# Patient Record
Sex: FEMALE | Race: WHITE | Employment: PART TIME | URBAN - METROPOLITAN AREA
[De-identification: names, ages, dates, MRNs, and addresses within clinical notes are randomized per-mention and may not be internally consistent; named-entity substitution may affect disease eponyms.]

---

## 2017-07-07 ENCOUNTER — APPOINTMENT (OUTPATIENT)
Dept: GENERAL RADIOLOGY | Age: 37
DRG: 871 | End: 2017-07-07
Attending: FAMILY MEDICINE
Payer: SELF-PAY

## 2017-07-07 ENCOUNTER — HOSPITAL ENCOUNTER (INPATIENT)
Age: 37
LOS: 3 days | Discharge: HOME | DRG: 871 | End: 2017-07-10
Attending: FAMILY MEDICINE | Admitting: FAMILY MEDICINE
Payer: SELF-PAY

## 2017-07-07 PROBLEM — I95.9 SEPSIS WITH HYPOTENSION (HCC): Status: ACTIVE | Noted: 2017-07-07

## 2017-07-07 PROBLEM — E83.42 HYPOMAGNESEMIA: Status: ACTIVE | Noted: 2017-07-07

## 2017-07-07 PROBLEM — A41.9 SEPSIS DUE TO URINARY TRACT INFECTION (HCC): Status: ACTIVE | Noted: 2017-07-07

## 2017-07-07 PROBLEM — N39.0 SEPSIS DUE TO URINARY TRACT INFECTION (HCC): Status: ACTIVE | Noted: 2017-07-07

## 2017-07-07 PROBLEM — E87.6 HYPOKALEMIA: Status: ACTIVE | Noted: 2017-07-07

## 2017-07-07 PROBLEM — A41.9 SEPSIS (HCC): Status: ACTIVE | Noted: 2017-07-07

## 2017-07-07 PROBLEM — N10 PYELONEPHRITIS, ACUTE: Status: ACTIVE | Noted: 2017-07-07

## 2017-07-07 PROBLEM — A41.9 SEPSIS WITH HYPOTENSION (HCC): Status: ACTIVE | Noted: 2017-07-07

## 2017-07-07 LAB
ANION GAP BLD CALC-SCNC: 6 MMOL/L (ref 5–15)
BUN SERPL-MCNC: 4 MG/DL (ref 6–20)
BUN/CREAT SERPL: 8 (ref 12–20)
CA-I BLD-SCNC: 0.96 MMOL/L (ref 1.13–1.32)
CALCIUM SERPL-MCNC: 7.1 MG/DL (ref 8.5–10.1)
CHLORIDE SERPL-SCNC: 113 MMOL/L (ref 97–108)
CO2 SERPL-SCNC: 21 MMOL/L (ref 21–32)
CREAT SERPL-MCNC: 0.52 MG/DL (ref 0.55–1.02)
GLUCOSE SERPL-MCNC: 104 MG/DL (ref 65–100)
LACTATE SERPL-SCNC: 2 MMOL/L (ref 0.4–2)
MAGNESIUM SERPL-MCNC: 2.5 MG/DL (ref 1.6–2.4)
POTASSIUM SERPL-SCNC: 4 MMOL/L (ref 3.5–5.1)
SODIUM SERPL-SCNC: 140 MMOL/L (ref 136–145)
TROPONIN I SERPL-MCNC: <0.04 NG/ML

## 2017-07-07 PROCEDURE — 74011000250 HC RX REV CODE- 250: Performed by: FAMILY MEDICINE

## 2017-07-07 PROCEDURE — 82330 ASSAY OF CALCIUM: CPT | Performed by: FAMILY MEDICINE

## 2017-07-07 PROCEDURE — 74011250636 HC RX REV CODE- 250/636: Performed by: FAMILY MEDICINE

## 2017-07-07 PROCEDURE — 36415 COLL VENOUS BLD VENIPUNCTURE: CPT | Performed by: FAMILY MEDICINE

## 2017-07-07 PROCEDURE — 85379 FIBRIN DEGRADATION QUANT: CPT | Performed by: FAMILY MEDICINE

## 2017-07-07 PROCEDURE — 71010 XR CHEST PORT: CPT

## 2017-07-07 PROCEDURE — 83735 ASSAY OF MAGNESIUM: CPT | Performed by: FAMILY MEDICINE

## 2017-07-07 PROCEDURE — 65610000006 HC RM INTENSIVE CARE

## 2017-07-07 PROCEDURE — 84484 ASSAY OF TROPONIN QUANT: CPT | Performed by: FAMILY MEDICINE

## 2017-07-07 PROCEDURE — 83605 ASSAY OF LACTIC ACID: CPT | Performed by: FAMILY MEDICINE

## 2017-07-07 PROCEDURE — 93005 ELECTROCARDIOGRAM TRACING: CPT

## 2017-07-07 PROCEDURE — 80048 BASIC METABOLIC PNL TOTAL CA: CPT | Performed by: FAMILY MEDICINE

## 2017-07-07 PROCEDURE — 80061 LIPID PANEL: CPT | Performed by: FAMILY MEDICINE

## 2017-07-07 RX ORDER — SODIUM CHLORIDE 0.9 % (FLUSH) 0.9 %
SYRINGE (ML) INJECTION
Status: DISCONTINUED
Start: 2017-07-07 | End: 2017-07-07

## 2017-07-07 RX ORDER — SODIUM CHLORIDE 0.9 % (FLUSH) 0.9 %
5-10 SYRINGE (ML) INJECTION AS NEEDED
Status: DISCONTINUED | OUTPATIENT
Start: 2017-07-07 | End: 2017-07-08 | Stop reason: SDUPTHER

## 2017-07-07 RX ORDER — DIPHENHYDRAMINE HYDROCHLORIDE 50 MG/ML
25 INJECTION, SOLUTION INTRAMUSCULAR; INTRAVENOUS
Status: DISCONTINUED | OUTPATIENT
Start: 2017-07-07 | End: 2017-07-07

## 2017-07-07 RX ORDER — ACETAMINOPHEN 325 MG/1
650 TABLET ORAL
Status: DISCONTINUED | OUTPATIENT
Start: 2017-07-07 | End: 2017-07-10 | Stop reason: HOSPADM

## 2017-07-07 RX ORDER — HYDROMORPHONE HYDROCHLORIDE 2 MG/ML
1 INJECTION, SOLUTION INTRAMUSCULAR; INTRAVENOUS; SUBCUTANEOUS
Status: DISCONTINUED | OUTPATIENT
Start: 2017-07-07 | End: 2017-07-07

## 2017-07-07 RX ORDER — MUPIROCIN 20 MG/G
OINTMENT TOPICAL 2 TIMES DAILY
Status: DISCONTINUED | OUTPATIENT
Start: 2017-07-08 | End: 2017-07-10 | Stop reason: HOSPADM

## 2017-07-07 RX ORDER — SODIUM CHLORIDE 0.9 % (FLUSH) 0.9 %
5-10 SYRINGE (ML) INJECTION AS NEEDED
Status: DISCONTINUED | OUTPATIENT
Start: 2017-07-07 | End: 2017-07-07

## 2017-07-07 RX ORDER — ONDANSETRON 2 MG/ML
4 INJECTION INTRAMUSCULAR; INTRAVENOUS
Status: DISCONTINUED | OUTPATIENT
Start: 2017-07-07 | End: 2017-07-10 | Stop reason: HOSPADM

## 2017-07-07 RX ORDER — SODIUM CHLORIDE 9 MG/ML
200 INJECTION, SOLUTION INTRAVENOUS CONTINUOUS
Status: DISCONTINUED | OUTPATIENT
Start: 2017-07-07 | End: 2017-07-07

## 2017-07-07 RX ORDER — MUPIROCIN 20 MG/G
OINTMENT TOPICAL 2 TIMES DAILY
Status: DISCONTINUED | OUTPATIENT
Start: 2017-07-07 | End: 2017-07-07

## 2017-07-07 RX ORDER — LEVOFLOXACIN 5 MG/ML
750 INJECTION, SOLUTION INTRAVENOUS EVERY 24 HOURS
Status: DISCONTINUED | OUTPATIENT
Start: 2017-07-08 | End: 2017-07-08

## 2017-07-07 RX ORDER — ACETAMINOPHEN 325 MG/1
650 TABLET ORAL
Status: DISCONTINUED | OUTPATIENT
Start: 2017-07-07 | End: 2017-07-07 | Stop reason: ALTCHOICE

## 2017-07-07 RX ORDER — KETOROLAC TROMETHAMINE 15 MG/ML
15 INJECTION, SOLUTION INTRAMUSCULAR; INTRAVENOUS
Status: DISCONTINUED | OUTPATIENT
Start: 2017-07-07 | End: 2017-07-07

## 2017-07-07 RX ORDER — LEVOFLOXACIN 5 MG/ML
750 INJECTION, SOLUTION INTRAVENOUS EVERY 24 HOURS
Status: DISCONTINUED | OUTPATIENT
Start: 2017-07-08 | End: 2017-07-07

## 2017-07-07 RX ORDER — ENOXAPARIN SODIUM 100 MG/ML
40 INJECTION SUBCUTANEOUS EVERY 24 HOURS
Status: DISCONTINUED | OUTPATIENT
Start: 2017-07-08 | End: 2017-07-07

## 2017-07-07 RX ORDER — SODIUM CHLORIDE 0.9 % (FLUSH) 0.9 %
5-10 SYRINGE (ML) INJECTION EVERY 8 HOURS
Status: DISCONTINUED | OUTPATIENT
Start: 2017-07-07 | End: 2017-07-10 | Stop reason: HOSPADM

## 2017-07-07 RX ORDER — SODIUM CHLORIDE 9 MG/ML
150 INJECTION, SOLUTION INTRAVENOUS CONTINUOUS
Status: DISCONTINUED | OUTPATIENT
Start: 2017-07-07 | End: 2017-07-10 | Stop reason: HOSPADM

## 2017-07-07 RX ORDER — SODIUM CHLORIDE 0.9 % (FLUSH) 0.9 %
5-10 SYRINGE (ML) INJECTION EVERY 8 HOURS
Status: DISCONTINUED | OUTPATIENT
Start: 2017-07-07 | End: 2017-07-08 | Stop reason: SDUPTHER

## 2017-07-07 RX ORDER — SODIUM CHLORIDE 0.9 % (FLUSH) 0.9 %
5-10 SYRINGE (ML) INJECTION AS NEEDED
Status: DISCONTINUED | OUTPATIENT
Start: 2017-07-07 | End: 2017-07-10 | Stop reason: HOSPADM

## 2017-07-07 RX ORDER — CALCIUM GLUCONATE 94 MG/ML
1 INJECTION, SOLUTION INTRAVENOUS
Status: DISCONTINUED | OUTPATIENT
Start: 2017-07-08 | End: 2017-07-08

## 2017-07-07 RX ADMIN — SODIUM CHLORIDE 100 ML/HR: 900 INJECTION, SOLUTION INTRAVENOUS at 22:23

## 2017-07-07 RX ADMIN — Medication 10 ML: at 22:24

## 2017-07-07 RX ADMIN — PROCHLORPERAZINE EDISYLATE 2.5 MG: 5 INJECTION INTRAMUSCULAR; INTRAVENOUS at 22:30

## 2017-07-07 RX ADMIN — Medication 10 ML: at 22:23

## 2017-07-07 NOTE — IP AVS SNAPSHOT
[address] [facility] 
[phone] Patient: Isac Calixto MRN: [id number] [facility]:31/76/0549 You are allergic to the following Allergen Reactions Penicillins Anaphylaxis Pt has previously had Ceftin w/o issue Recent Documentation Height Weight BMI OB Status Smoking Status 1.626 m 65.9 kg 24.94 kg/m2 Having regular periods Never Smoker Unresulted Labs Order Current Status EHRLICHIOSIS (HME AND HGE) AB PANEL In process R RICKETTSII AB IGG W/REFL In process R RICKETTSII IGM In process Emergency Contacts  (Rel.) Home Phone Work Phone Mobile Phone Cherelle Osuna (Mother) -- -- [phone] About your hospitalization You were admitted on:  July 7, 2017 You last received care in the:  [facility] 1 MEDICAL ONCOLOGY You were discharged on:  July 10, 2017 Why you were hospitalized Your primary diagnosis was:  Not on File Your diagnoses also included:  Sepsis (Hcc) Providers Seen During Your Hospitalizations Provider Role Specialty Primary office phone Paula Cortez MD Attending Provider Hospitalist [phone] Olam Cushing, MD Attending Provider Hospitalist [phone] Your Primary Care Physician (PCP) Primary Care Physician Office Phone Office Fax NONE ** None ** ** None ** Follow-up Information Follow up With Details Comments Contact Info None   None (395) Patient stated that they have no PCP Current Discharge Medication List  
  
START taking these medications Dose & Instructions Dispensing Information Comments Morning Noon Evening Bedtime  
 ciprofloxacin HCl 500 mg tablet Commonly known as:  CIPRO Your last dose was: Your next dose is:    
   
   
 Dose:  500 mg Take 1 Tab by mouth two (2) times a day for 10 days. Quantity:  20 Tab Refills:  0  
     
   
   
   
  
 doxycycline 100 mg tablet Commonly known as:  ADOXA Your last dose was: Your next dose is:    
   
   
 Dose:  100 mg Take 1 Tab by mouth two (2) times a day for 5 days. Quantity:  10 Tab Refills:  0  
     
   
   
   
  
 ibuprofen 400 mg tablet Commonly known as:  MOTRIN Your last dose was: Your next dose is:    
   
   
 Dose:  400 mg Take 1 Tab by mouth every six (6) hours as needed. Quantity:  25 Tab Refills:  0 CONTINUE these medications which have NOT CHANGED Dose & Instructions Dispensing Information Comments Morning Noon Evening Bedtime  
 ondansetron 4 mg disintegrating tablet Commonly known as:  ZOFRAN ODT Your last dose was: Your next dose is:    
   
   
 Dose:  4 mg Take 1 Tab by mouth every eight (8) hours as needed for Nausea. Quantity:  10 Tab Refills:  0 STOP taking these medications   
 cefUROXime 500 mg tablet Commonly known as:  CEFTIN  
   
  
 HYDROcodone-acetaminophen 5-325 mg per tablet Commonly known as:  Alison Donaldson Where to Get Your Medications Information on where to get these meds will be given to you by the nurse or doctor. ! Ask your nurse or doctor about these medications  
  ciprofloxacin HCl 500 mg tablet  
 doxycycline 100 mg tablet  
 ibuprofen 400 mg tablet  
 ondansetron 4 mg disintegrating tablet Discharge Instructions Patient Discharge Instructions Elyse Maxwell / [id number] : 1980 Admitted 2017 Discharged: 7/10/2017 DISCHARGE DIAGNOSIS:  
 
Sepsis ([address] (2017) E coli pyelonephritis Headache ? Tickborne infection What to do at [facility] 1. Recommended diet: Regular Diet 2. Recommended activity: Activity as tolerated 3.  If you experience any of the following symptoms then please call your primary care physician or return to the emergency room if you cannot get hold of your doctor: 
 Fevers > 100.5, chills Nausea or vomiting, persistent diarrhea > 24 hours Blood in stool or black stools Chest pain or SOB Follow-up Information Follow up With Details Comments Contact Info None   None (395) Patient stated that they have no PCP Should expect to feel better every day, decreasing symptoms and no fevers Call Dr Sonya Lewis or go to the ED if you develop new fevers or worsening headache Cell phone [phone] am to 9 pm daily Information obtained by : 
I understand that if any problems occur once I am at home I am to contact my physician. I understand and acknowledge receipt of the instructions indicated above. Physician's or R.N.'s Signature                                                                  Date/Time Patient or Representative Signature                                                          Date/Time Discharge Orders None [employer] Announcement We are excited to announce that we are making your provider's discharge notes available to you in [employer]. You will see these notes when they are completed and signed by the physician that discharged you from your recent hospital stay. If you have any questions or concerns about any information you see in [employer], please call the Health Information Department where you were seen or reach out to your Primary Care Provider for more information about your plan of care. Introducing [facility]!    
 [facility] introduces [employer] patient portal. Now you can access parts of your medical record, email your doctor's office, and request medication refills online. 1. In your internet browser, go to [email or web address] 2. Click on the First Time User? Click Here link in the Sign In box. You will see the New Member Sign Up page. 3. Enter your [employer] Access Code exactly as it appears below. You will not need to use this code after youve completed the sign-up process. If you do not sign up before the expiration date, you must request a new code. · [employer] Access Code: [email or web address] Expires: 10/4/2017 11:24 PM 
 
4. Enter the last four digits of your Social Security Number (xxxx) and Date of Birth (mm/dd/yyyy) as indicated and click Submit. You will be taken to the next sign-up page. 5. Create a [employer] ID. This will be your [employer] login ID and cannot be changed, so think of one that is secure and easy to remember. 6. Create a [employer] password. You can change your password at any time. 7. Enter your Password Reset Question and Answer. This can be used at a later time if you forget your password. 8. Enter your e-mail address. You will receive e-mail notification when new information is available in [address] 9. Click Sign Up. You can now view and download portions of your medical record. 10. Click the Download Summary menu link to download a portable copy of your medical information. If you have questions, please visit the Frequently Asked Questions section of the [employer] website. Remember, [employer] is NOT to be used for urgent needs. For medical emergencies, dial 911. Now available from your iPhone and Android! General Information Please provide this summary of care documentation to your next provider. Patient Signature:  ____________________________________________________________ Date:  ____________________________________________________________  
  
Emmanuel Brake  Provider Signature: ____________________________________________________________ Date:  ____________________________________________________________

## 2017-07-08 ENCOUNTER — APPOINTMENT (OUTPATIENT)
Dept: CT IMAGING | Age: 37
DRG: 871 | End: 2017-07-08
Attending: INTERNAL MEDICINE
Payer: SELF-PAY

## 2017-07-08 LAB
ALBUMIN SERPL BCP-MCNC: 2.1 G/DL (ref 3.5–5)
ALBUMIN/GLOB SERPL: 0.7 {RATIO} (ref 1.1–2.2)
ALP SERPL-CCNC: 64 U/L (ref 45–117)
ALT SERPL-CCNC: 13 U/L (ref 12–78)
ANION GAP BLD CALC-SCNC: 7 MMOL/L (ref 5–15)
AST SERPL W P-5'-P-CCNC: 9 U/L (ref 15–37)
ATRIAL RATE: 107 BPM
BASOPHILS # BLD AUTO: 0 K/UL (ref 0–0.1)
BASOPHILS # BLD: 0 % (ref 0–1)
BILIRUB SERPL-MCNC: 0.4 MG/DL (ref 0.2–1)
BUN SERPL-MCNC: 3 MG/DL (ref 6–20)
BUN/CREAT SERPL: 7 (ref 12–20)
CALCIUM SERPL-MCNC: 7.3 MG/DL (ref 8.5–10.1)
CALCULATED P AXIS, ECG09: 51 DEGREES
CALCULATED R AXIS, ECG10: 67 DEGREES
CALCULATED T AXIS, ECG11: 55 DEGREES
CHLORIDE SERPL-SCNC: 109 MMOL/L (ref 97–108)
CHOLEST SERPL-MCNC: 114 MG/DL
CO2 SERPL-SCNC: 22 MMOL/L (ref 21–32)
CREAT SERPL-MCNC: 0.43 MG/DL (ref 0.55–1.02)
D DIMER PPP FEU-MCNC: 1.23 MG/L FEU (ref 0–0.65)
DIAGNOSIS, 93000: NORMAL
EOSINOPHIL # BLD: 0 K/UL (ref 0–0.4)
EOSINOPHIL NFR BLD: 0 % (ref 0–7)
ERYTHROCYTE [DISTWIDTH] IN BLOOD BY AUTOMATED COUNT: 12.9 % (ref 11.5–14.5)
GLOBULIN SER CALC-MCNC: 2.9 G/DL (ref 2–4)
GLUCOSE SERPL-MCNC: 93 MG/DL (ref 65–100)
HCG UR QL: NEGATIVE
HCT VFR BLD AUTO: 28.4 % (ref 35–47)
HDLC SERPL-MCNC: 29 MG/DL
HDLC SERPL: 3.9 {RATIO} (ref 0–5)
HGB BLD-MCNC: 9.6 G/DL (ref 11.5–16)
LACTATE SERPL-SCNC: 0.5 MMOL/L (ref 0.4–2)
LDLC SERPL CALC-MCNC: 69.4 MG/DL (ref 0–100)
LIPID PROFILE,FLP: NORMAL
LYMPHOCYTES # BLD AUTO: 13 % (ref 12–49)
LYMPHOCYTES # BLD: 1 K/UL (ref 0.8–3.5)
MCH RBC QN AUTO: 31.2 PG (ref 26–34)
MCHC RBC AUTO-ENTMCNC: 33.8 G/DL (ref 30–36.5)
MCV RBC AUTO: 92.2 FL (ref 80–99)
MONOCYTES # BLD: 1 K/UL (ref 0–1)
MONOCYTES NFR BLD AUTO: 13 % (ref 5–13)
NEUTS SEG # BLD: 6.1 K/UL (ref 1.8–8)
NEUTS SEG NFR BLD AUTO: 74 % (ref 32–75)
P-R INTERVAL, ECG05: 130 MS
PLATELET # BLD AUTO: 151 K/UL (ref 150–400)
POTASSIUM SERPL-SCNC: 3.7 MMOL/L (ref 3.5–5.1)
PROT SERPL-MCNC: 5 G/DL (ref 6.4–8.2)
Q-T INTERVAL, ECG07: 326 MS
QRS DURATION, ECG06: 74 MS
QTC CALCULATION (BEZET), ECG08: 435 MS
RBC # BLD AUTO: 3.08 M/UL (ref 3.8–5.2)
SODIUM SERPL-SCNC: 138 MMOL/L (ref 136–145)
TRIGL SERPL-MCNC: 78 MG/DL (ref ?–150)
TROPONIN I SERPL-MCNC: <0.04 NG/ML
VENTRICULAR RATE, ECG03: 107 BPM
VLDLC SERPL CALC-MCNC: 15.6 MG/DL
WBC # BLD AUTO: 8.2 K/UL (ref 3.6–11)

## 2017-07-08 PROCEDURE — 83605 ASSAY OF LACTIC ACID: CPT | Performed by: FAMILY MEDICINE

## 2017-07-08 PROCEDURE — 74011250636 HC RX REV CODE- 250/636: Performed by: FAMILY MEDICINE

## 2017-07-08 PROCEDURE — 74011000258 HC RX REV CODE- 258: Performed by: INTERNAL MEDICINE

## 2017-07-08 PROCEDURE — 74011250636 HC RX REV CODE- 250/636: Performed by: EMERGENCY MEDICINE

## 2017-07-08 PROCEDURE — 36415 COLL VENOUS BLD VENIPUNCTURE: CPT | Performed by: FAMILY MEDICINE

## 2017-07-08 PROCEDURE — 84484 ASSAY OF TROPONIN QUANT: CPT

## 2017-07-08 PROCEDURE — 80053 COMPREHEN METABOLIC PANEL: CPT

## 2017-07-08 PROCEDURE — 81025 URINE PREGNANCY TEST: CPT | Performed by: INTERNAL MEDICINE

## 2017-07-08 PROCEDURE — 74011250636 HC RX REV CODE- 250/636: Performed by: INTERNAL MEDICINE

## 2017-07-08 PROCEDURE — 74011250637 HC RX REV CODE- 250/637: Performed by: FAMILY MEDICINE

## 2017-07-08 PROCEDURE — 71275 CT ANGIOGRAPHY CHEST: CPT

## 2017-07-08 PROCEDURE — 74011636320 HC RX REV CODE- 636/320: Performed by: INTERNAL MEDICINE

## 2017-07-08 PROCEDURE — 74011000250 HC RX REV CODE- 250: Performed by: INTERNAL MEDICINE

## 2017-07-08 PROCEDURE — 65610000006 HC RM INTENSIVE CARE

## 2017-07-08 PROCEDURE — 74011000258 HC RX REV CODE- 258: Performed by: FAMILY MEDICINE

## 2017-07-08 PROCEDURE — 85025 COMPLETE CBC W/AUTO DIFF WBC: CPT | Performed by: FAMILY MEDICINE

## 2017-07-08 RX ORDER — AZTREONAM 1 G/1
1 INJECTION, POWDER, LYOPHILIZED, FOR SOLUTION INTRAMUSCULAR; INTRAVENOUS EVERY 8 HOURS
Status: DISCONTINUED | OUTPATIENT
Start: 2017-07-08 | End: 2017-07-08

## 2017-07-08 RX ORDER — SODIUM CHLORIDE 0.9 % (FLUSH) 0.9 %
10 SYRINGE (ML) INJECTION
Status: COMPLETED | OUTPATIENT
Start: 2017-07-08 | End: 2017-07-08

## 2017-07-08 RX ORDER — HYDROMORPHONE HYDROCHLORIDE 1 MG/ML
0.5 INJECTION, SOLUTION INTRAMUSCULAR; INTRAVENOUS; SUBCUTANEOUS
Status: DISCONTINUED | OUTPATIENT
Start: 2017-07-08 | End: 2017-07-08

## 2017-07-08 RX ORDER — SODIUM CHLORIDE 9 MG/ML
50 INJECTION, SOLUTION INTRAVENOUS
Status: COMPLETED | OUTPATIENT
Start: 2017-07-08 | End: 2017-07-09

## 2017-07-08 RX ORDER — HYDROMORPHONE HYDROCHLORIDE 1 MG/ML
0.2 INJECTION, SOLUTION INTRAMUSCULAR; INTRAVENOUS; SUBCUTANEOUS
Status: DISCONTINUED | OUTPATIENT
Start: 2017-07-08 | End: 2017-07-09

## 2017-07-08 RX ADMIN — DOXYCYCLINE 100 MG: 100 INJECTION, POWDER, LYOPHILIZED, FOR SOLUTION INTRAVENOUS at 21:06

## 2017-07-08 RX ADMIN — GENTAMICIN SULFATE 300 MG: 40 INJECTION, SOLUTION INTRAMUSCULAR; INTRAVENOUS at 00:02

## 2017-07-08 RX ADMIN — SODIUM CHLORIDE 100 ML/HR: 900 INJECTION, SOLUTION INTRAVENOUS at 09:03

## 2017-07-08 RX ADMIN — Medication 10 ML: at 21:10

## 2017-07-08 RX ADMIN — ACETAMINOPHEN 650 MG: 325 TABLET, FILM COATED ORAL at 22:52

## 2017-07-08 RX ADMIN — SODIUM CHLORIDE 100 ML/HR: 900 INJECTION, SOLUTION INTRAVENOUS at 19:10

## 2017-07-08 RX ADMIN — HYDROMORPHONE HYDROCHLORIDE 0.5 MG: 1 INJECTION, SOLUTION INTRAMUSCULAR; INTRAVENOUS; SUBCUTANEOUS at 02:02

## 2017-07-08 RX ADMIN — IOPAMIDOL 100 ML: 755 INJECTION, SOLUTION INTRAVENOUS at 02:00

## 2017-07-08 RX ADMIN — AZTREONAM 1 G: 1 INJECTION, POWDER, LYOPHILIZED, FOR SOLUTION INTRAMUSCULAR; INTRAVENOUS at 23:39

## 2017-07-08 RX ADMIN — Medication 10 ML: at 05:10

## 2017-07-08 RX ADMIN — Medication 10 ML: at 13:29

## 2017-07-08 RX ADMIN — LEVOFLOXACIN 750 MG: 5 INJECTION, SOLUTION INTRAVENOUS at 05:10

## 2017-07-08 RX ADMIN — CALCIUM GLUCONATE 1 G: 94 INJECTION, SOLUTION INTRAVENOUS at 01:53

## 2017-07-08 RX ADMIN — HYDROMORPHONE HYDROCHLORIDE 0.2 MG: 1 INJECTION, SOLUTION INTRAMUSCULAR; INTRAVENOUS; SUBCUTANEOUS at 19:46

## 2017-07-08 RX ADMIN — Medication 10 ML: at 22:00

## 2017-07-08 RX ADMIN — MUPIROCIN: 20 OINTMENT TOPICAL at 08:33

## 2017-07-08 RX ADMIN — MUPIROCIN: 20 OINTMENT TOPICAL at 18:12

## 2017-07-08 RX ADMIN — ONDANSETRON 4 MG: 2 INJECTION INTRAMUSCULAR; INTRAVENOUS at 16:53

## 2017-07-08 RX ADMIN — ACETAMINOPHEN 650 MG: 325 TABLET, FILM COATED ORAL at 12:14

## 2017-07-08 RX ADMIN — ACETAMINOPHEN 650 MG: 325 TABLET, FILM COATED ORAL at 16:53

## 2017-07-08 RX ADMIN — PHENYLEPHRINE HYDROCHLORIDE 10 MCG/MIN: 10 INJECTION INTRAVENOUS at 01:53

## 2017-07-08 RX ADMIN — ACETAMINOPHEN 650 MG: 325 TABLET, FILM COATED ORAL at 08:25

## 2017-07-08 RX ADMIN — Medication 10 ML: at 19:47

## 2017-07-08 RX ADMIN — Medication 10 ML: at 01:28

## 2017-07-08 RX ADMIN — SODIUM CHLORIDE 50 ML/HR: 900 INJECTION, SOLUTION INTRAVENOUS at 02:00

## 2017-07-08 RX ADMIN — ACETAMINOPHEN 650 MG: 325 TABLET, FILM COATED ORAL at 00:07

## 2017-07-08 NOTE — PROGRESS NOTES
[facility] Consult Service Progress NOTE  Pulmonary, Critical Care, and Sleep Medicine    Name: Christian Murillo MRN: [id number]   : 1980 Hospital: [address] [facility]   Date: 2017  Admission Date: 2017     Chart and notes reviewed. Data reviewed. Pt seen on rounds earlier today. I have evaluated and examined the patient. Pt is acutely ill. Reviewed the evaluation and will assist in implementing the plan as outlined by Dr. Mary Ellen Ochoa and team    Hospital Day: 2    Patient Active Problem List   Diagnosis Code    Pyelonephritis, acute N10    Sepsis with hypotension ([address] A41.9    Hypokalemia E87.6    Hypomagnesemia E83.42    Sepsis ([address] A41.9       I was asked by Kai Fregoso MD to see Christian Murillo in consultation for a chief complaint of sepsis with shock. Briefly, pt is a 40 yo F w/o significant PMHx admitted to [facility] with sepsis and hypotension UTI/acute pyelonephritis. Non-contrast CT abd/pelvis and retroperitoneal US were WNL w/o evidence of stones, perinephric abscess, hydronephrosis or hydroureter. Pt received multiple IVF boluses in ED with initial improvement in BP and eventually admitted to the floor and continued on maintenance IVF (NS at 200 mL/hr) and empiric IV Abx coverage with Levaquin. She spiked temp of 102 around 1500 and gentamicin added to her regimen. Pt continued to c/o left flank pain that is not relieved by IV Toradol but BP dropped when started on morphine and later Dilaudid. She received total of 2 L NS boluses with BP improving to 100/56. BP, however, dropped again to 83/52 and at this point family requested transfer to a higher level of care/ICU setting for possibility of pt's needing pressors support. Pt accepted by Dr. Mary Ellen Ochoa, hospitalist on call at [address] Pt is in CCU on IV sierra. She received over 6 liters of IV fluids since admission. Is on her menstrual period. Flank, abdominal pain a little better.  He last UTI was six months ago. She does not always take abx and will self treat with cranberry juice and lots of wather. Long history of chronic UTIs. IMPRESSION:   1. Sepsis with shock, most likely from urinary source  2. Probable pyelonephritis with negative non contrast CT at [facility]  3. H/o recurrent UTI  4. Anemia from heavy periods? And now dilutional  5. Severe hypoalbuminemia  6. Pt is critically ill and at high risk of end organ dysfunction and failure  7. Critical care time with pt exclusive of procedures   30      minutes      RECOMMENDATIONS/PLAN:   1. IV sierra  2. D/c gentamicin  3. Continue IV levaquin  4. Await cultures  5. CCU monitoring  6. DVT, SUP prophylaxis   7. Will be available to assist in medical management while in the CCU pending disposition     ICU disposition :Unchanged. Risk of deterioration: high   [x] High complexity decision making was performed   [x] See my orders for details  Tubes:       Code: Full Code        Hemodynamics:    CO:    CI:    CVP:    SVR:   PAP Systolic:    PAP Diastolic:    PVR:    ZT57:        Ventilator Settings:      Mode Rate TV Press PEEP FiO2 PIP Min.  Vent                            Vital Signs: Intake/Output: Intake/Output:   Visit Vitals    BP 90/61    Pulse 80    Temp (!) 101.3 °F (38.5 °C)  Comment: Tylenol given    Resp 20    Ht 5' 4\" (1.626 m)    Wt 64 kg (141 lb 1.5 oz)    LMP 2017    SpO2 98%    BMI 24.22 kg/m2         O2 Device: Room air     Wt Readings from Last 4 Encounters:   17 64 kg (141 lb 1.5 oz)   17 59.9 kg (132 lb)       Temp (24hrs), Av °F (37.8 °C), Min:97.2 °F (36.2 °C), Max:103 °F (39.4 °C)     Intake/Output Summary (Last 24 hours) at 17 0957  Last data filed at 17 0900   Gross per 24 hour   Intake          1727.17 ml   Output             1585 ml   Net           142.17 ml     Last shift:      701 - 1900  In: 330 [I.V.:330]  Out: 385 [Urine:385]  Last 3 shifts: 1901 -  07  In: 1397.2 [P.O.:240; I.V.:1157.2]  Out: 1200 [Urine:1200]     Telemetry:    normal sinus rhythm    Physical Exam:    General: WDWN WF in no respiratory distress and acyanotic, alert and oriented times 3   HEENT: NCAT; no thrush   Neck: No abnormally enlarged lymph nodes. Chest: normal   Lungs: normal air entry   Heart: Regular rate and rhythm   Abdomen: soft, non-tender, without masses or organomegaly   :    Extremity: negative, clubbing;    Neuro: alert   Psych: oriented to time, place and person    Skin: Diaphoretic;   Pulses: Bilateral, Radial, 1+ Normal upper and lower extremity pulses   Capillary refill: normal warm;    DATA:    Interval lab and diagnostic data was reviewed. Interval radiology images were independently viewed and available reports were reviewed. All lab results for the last 24 hours reviewed.       MAR reviewed and pertinent medications noted or modified as needed    MEDS:   Current Facility-Administered Medications   Medication    Please draw Random Gentamicin level    PHENYLephrine (NEOSYNEPHRINE) 30,000 mcg in 0.9% sodium chloride 250 mL infusion    HYDROmorphone (PF) (DILAUDID) injection 0.2 mg    sodium chloride (NS) flush 5-10 mL    0.9% sodium chloride infusion    acetaminophen (TYLENOL) tablet 650 mg    levoFLOXacin (LEVAQUIN) 750 mg in D5W IVPB    sodium chloride (NS) flush 5-10 mL    ondansetron (ZOFRAN) injection 4 mg    prochlorperazine (COMPAZINE) with saline injection 2.5 mg    mupirocin (BACTROBAN) 2 % ointment        Labs:    Recent Labs      07/08/17 0328  07/07/17   0600 07/06/17 2015   WBC  8.2  8.0  9.8   HGB  9.6*  9.8*  12.2   PLT  151  147*  166     Recent Labs      07/08/17   0328  07/07/17   2222  07/07/17   0739  07/07/17   0600   07/06/17 2015   NA  138  140   --   139   < >  137   K  3.7  4.0   --   3.1*   < >  3.6   CL  109*  113*   --   104   < >  101   CO2  22  21   --   22   < >  27   GLU  93  104*   --   148*   < >  101*   BUN  3*  4* --   10   < >  12   CREA  0.43*  0.52*   --   0.81   < >  0.83   CA  7.3*  7.1*   --   7.0*   < >  8.4*   MG   --   2.5*  1.5*   --    --    --    LAC  0.5  2.0   --   0.8   < >  0.5   ALB  2.1*   --    --    --    --   3.4*   SGOT  9*   --    --    --    --   12*   ALT  13   --    --    --    --   14   LPSE   --    --    --    --    --   68*    < > = values in this interval not displayed. No results for input(s): PH, PCO2, PO2, HCO3, FIO2 in the last 72 hours.   Recent Labs      07/08/17   0328  07/07/17   2222   TROIQ  <0.04  <0.04     No results found for: BNPP, BNP   Lab Results   Component Value Date/Time    Culture result: NO GROWTH AFTER 15 HOURS 07/07/2017 01:45 PM    Culture result: NO GROWTH AFTER 15 HOURS 07/07/2017 01:45 PM    Culture result: NO GROWTH AFTER 22 HOURS 07/07/2017 04:15 AM     No results found for: VANCT, CPK  Lab Results   Component Value Date/Time    Color DARK YELLOW 07/07/2017 04:15 AM    Appearance HAZY 07/07/2017 04:15 AM    Specific gravity 1.020 07/07/2017 04:15 AM    pH (UA) 5.5 07/07/2017 04:15 AM    Protein 30 07/07/2017 04:15 AM    Glucose 100 07/07/2017 04:15 AM    Ketone 15 07/07/2017 04:15 AM    Blood MODERATE 07/07/2017 04:15 AM    Urobilinogen >8.0 07/07/2017 04:15 AM    Nitrites NEGATIVE  07/07/2017 04:15 AM    Leukocyte Esterase TRACE 07/07/2017 04:15 AM    WBC 20-50 07/07/2017 04:15 AM    RBC 0-5 07/07/2017 04:15 AM    Bacteria 1+ 07/07/2017 04:15 AM       No results found for: IRON, FE, TIBC, IBCT, PSAT, FERR  No results found for: SR, CRP, CARLOS, ANAIGG, RA, RPR, RPRT, VDRLT, VDRLS, TSH, TSHEXT   No results found for: TOXA1, RPR, HBCM, HBSAG, HAAB, HCAB1, HAAT, G6PD, CRYAC, HIVGT, HIVR, HIV1, HIV12, HIVPC, HIVRPI    Imaging:  []                           I have personally reviewed the patients radiographs and reports:        Results from Hospital Encounter encounter on 07/07/17   XR CHEST PORT   Narrative PORTABLE CHEST RADIOGRAPH/S: 7/7/2017 10:23 PM    INDICATION: Cough.    COMPARISON: 7/6/2017. TECHNIQUE: Portable frontal semiupright radiograph/s of the chest.    FINDINGS:   The lungs are clear. The central airways are patent. No pneumothorax or pleural  effusion. Impression IMPRESSION:   Clear lungs. Results from [place] encounter on 07/07/17   CTA CHEST W OR W WO CONT   Narrative CT ANGIOGRAPHY CHEST. 7/8/2017 1:32 AM     INDICATION: Abnormal d-dimer, tachypnea. COMPARISON: CT abdomen pelvis 7/6/2017. Same day chest radiograph. TECHNIQUE: CT angiography of the chest was performed after the administration of  100 cc IV contrast (Isovue 370). Coronal and sagittal, and coronal MIP  reconstructions were performed. CT dose reduction was achieved through use of a  standardized protocol tailored for this examination and automatic exposure  control for dose modulation. FINDINGS:  Interlobular septal thickening and bronchial wall thickening are consistent with  interstitial edema. There are trace bilateral pleural effusions. The heart is at  the upper limits of normal in size globally, and the left ventricle is enlarged. There is a trace pericardial effusion. There is no pulmonary embolism. The  central airways are patent. No pneumothorax. No thoracic lymphadenopathy. The  visualized upper abdomen is normal.         Impression IMPRESSION: Pulmonary edema and trace pleural and pericardial effusions. Left  ventriculomegaly. I have provided   30    minutes of critical care time rendering care exclusive of any procedures. During this entire length of time I was immediately available to the patient.      The reason for providing this level of medical care was due to a critical illness that impaired one or more vital organ systems, such that there was a high probability of imminent or life threatening deterioration in the patient's condition. Pt's condition is unstable and unpredictable.  This care involved high complexity decision making which includes reviewing the patient's past medical records, current laboratory results, medications that were immediately available to me and actual Xray films in order to assess, support vital system function, and to treat this degree of vital organ system failure, and to prevent further life threatening deterioration of the patients condition.     Anjelica Johnson MD

## 2017-07-08 NOTE — PROGRESS NOTES
Bedside and Verbal shift change report given to KADEEM Gage RN (oncoming nurse) by Rema Smith. Christin King RN (offgoing nurse). Report included the following information SBAR, Kardex, Intake/Output, MAR, Recent Results and Cardiac Rhythm NSR.   1930: Assessment completed. Patient reports that she feels much better. BP improved. Remains on Rojas @ 20mcg/min. Lungs clear, assessment benign other than non-productive cough and intermittent headach. 2106: Doxycycline infusion started L hand 20g PIV.  2120: Patient calls, c/o burning at IV site. Slowed rate of doxycycline to 50ml/hr with no improvement. IV site flushes well with brisk blood return. Moved abx line to R AC. Ice pack applied to R ac site. Patient reports tolerating well.   2200: Assisted patient up to bathroom. Patient reports RAC PIV site wet. Leaking clear fluid from site. No erythema, edema noted. PIV discontinued with catheter tip intact. 2250: After multiple attempts, 20g PIV started R outer AC by DURAN Díaz RN. Doxycycline restarted at 50ml/hr (approx 45 minutes left in current infusion.) Will start azactam infusion following doxy completion. 2330: Reassessment unchanged. 0018: Rojas-synephrine discontinued. 0025: Patient c/o headache, rates 6/10. Medicated with Dilaudid 0.2mg IV. 0200: Restarted Rojas @ 10mcg/min as BP not rebounded after Dilaudid given, though patient reports headache near resolved. 7160: Ambulated patient to bathroom to void. Reassessment unchanged. Intermittent headache, medicated alternating Dilaudid 0.2mg IV and Tylenol 650mg po. BP improved on Rojas @ 10mcg. 1+ edema vesta hands. Patient removed 4 rings, (3 yellow metal-2 of those with clear stones; and 1 silver metal ring.) Secured in sealable bag, placed in patient's makeup bag in closet with her consent and knowledge. 6762: Bedside and Verbal shift change report given to TARIK Warner RN (oncoming nurse) by KADEEM Gage RN (offgoing nurse).  Report included the following information SBAR, Kardex, Intake/Output, MAR, Recent Results and Cardiac Rhythm nsr.

## 2017-07-08 NOTE — H&P
Kennard Azeem Baig, [address]   HISTORY AND PHYSICAL       Name:  Judy Bustamante   MR#:  [id number]   :  1980   Account #:  [de-identified]        Date of Adm:  2017       CHIEF COMPLAINT: Flank pain and fever. HISTORY OF PRESENT ILLNESS: The patient is a 43-year-old female   with history of chronic UTIs, who presents to the hospital complaining   of the above mentioned symptoms. The patient reports that she   presented to [facility], presented with low   blood pressure and fevers. The patient reports that the symptoms   started about 2-3 days back. She felt that she had a urinary tract   infection. The patient reports that she has had multiple urinary tract   infections, usually that resolve on outpatient treatment, especially with   hydration. This time, she followed the same regimen, started drinking   increased amounts of fluid, but continued to have fever and chills. She   took some ibuprofen for the same, and started having some back pain   and bilateral flank pain. The patient also had one bout of vomiting, but   no melena, hemoptysis or hematemesis. She presents to the ER with a   temperature of 102.9, as well as was found to be hypotensive with   pallor and diaphoresis. The patient was admitted to the hospital and   was started on IV fluids, and levofloxacin. The patient continued to be   mildly hypotensive, was given multiple boluses of IV fluids and also   was started on gentamicin this morning. The patient continued to be   somewhat hypotensive, continued to have pain. Dose of Dilaudid was   tried, but the patient became hypotensive with the same, and thus, the   patient was requested to be transferred to [facility]. On arrival to the ICU, the patient was mildly hypotensive, and   complains of some pain in her bilateral flanks.  The patient reports that   she did have a headache, but denies any neck pain associated with   that. Denies any blurred vision, any neck stiffness, any runny nose,   sore throat, trouble swallowing, or trouble with speech. The patient   does report that she had a brief episode of some chest \"tightness\" and   some shortness of breath this morning but that resolved. The patient   currently is maintaining 100% O2 saturation on the monitor. The patient   also denies any lightheadedness, dizziness, any constipation, diarrhea,   focal or generalized neurologic weakness, any rash. Denies recent sick   contracts, consumption of exotic or unconventional food, or any other   symptoms or problems. The patient's mother is at bedside. PAST MEDICAL HISTORY: See above. HOME MEDICATIONS: The patient does not take anything on a   regular basis. REVIEW OF SYSTEMS: All point review of systems was done, which   was essentially negative except for the symptoms mentioned above. ALLERGIES: PENICILLIN. FAMILY HISTORY: Was discussed. Father had history of hypertension   and diabetes. PHYSICAL EXAMINATION   VITAL SIGNS: Temperature 99, pulse 84, respiratory rate 19, blood   pressure 82/43, pulse oximetry 100% on 2 Liters. GENERAL: Alert x3, awake, moderately distressed, pleasant female,   appears to be stated age. HEENT: Pupils equal and reactive to light. Dry mucous membranes. Tympanic membranes clear. NECK: Supple , no rigidity or fullness. CHEST: Clear to auscultation bilaterally. CORONARY: S1 and S2 were heard. ABDOMEN: Soft, tender to palpation in the flanks. No guarding or   rebound. Bowel sounds were hypoactive. EXTREMITIES: No cyanosis, clubbing or edema. NEUROPSYCHIATRIC: Pleasant mood and affect. Sensory grossly   within normal limits. DTRs 2+. Strength 5/5. SKIN: Warm. LABORATORY DATA: White count 8.0, hemoglobin 9.8, hematocrit   34, and platelets 127.  Sodium 139, potassium 3.1, chloride 22, anion   gap 13, BUN 10, creatinine 0.81, calcium 7, magnesium 1.5. Lactic   acid 0.5. Ultrasound of the abdomen showed unremarkable renal ultrasound. CT of the abdomen and pelvis showed normal CT of the abdomen and   pelvis. Urine done yesterday showed trace leukocyte esterase, 10-15 WBCs,   and 1+ bacteria. Blood cultures are pending. ASSESSMENT AND PLAN    1. Sepsis, most likely from urinary source. The patient will be admitted   to the ICU. Will start the patient on Rojas-Synephrine. Will provide   gentle IV hydration. Continue with Levaquin. Await culture results and   continue to closely monitor. Provide antiemetics and will reassess as   needed. Discuss with Anesthesiology, at this point in time, need for   central line is not needed, but  that they are available for placing a   line if so needed. Will continue to monitor on a critical care bed and   further intervention will be per hospital course. May consider getting   Infectious Disease input if symptoms do not resolve. Again, will await   culture results. 2. Hypokalemia and hypomagnesemia. Will recheck BMP and   magnesium levels, if still low, will replete. Further intervention will be   per hospital course. 3. Shortness of breath, mild. Most likely secondary to pain. The patient   continues to maintain 100% saturation. Will get a chest x-ray and D-  dimer level. May consider getting a CT of the chest if shortness of   breath persists. Further intervention will be per hospital course. Reassess as needed. 4. Gastrointestinal and deep venous thrombosis prophylaxis. The patient   will be on sequential compression devices.          MD Rakel Luo / Mercedes   D:  07/07/2017   22:14   T:  07/07/2017   23:30   Job #:  [id number]

## 2017-07-08 NOTE — PROGRESS NOTES
Patient received to ED [facility] CCU 2534. Patient awake, alert, oriented x 4 and ambulates from stretcher to bed. Report received from Ab Fortune RN (Critical Care Transport). Assessment benign other than patient c/o intermittent waves of pain, non-productive cough. Temp 99 orally. Remains hypotensive. Primary Nurse Sallie Nissen, RN and D. Wilder Libman, RN performed a dual skin assessment on this patient No impairment noted  Sai score is 20  2100: Assisted patient up to bathroom, voids 200ml, patient on menses. NS started at 200ml/hr for hypotension until Dr. Bhanu Pérez arrives to give orders. 2120: Dr. Bhanu Pérez in to see patient. 2342: Notified Dr. Steven Hansen of ionized calcium 0.96. Dr. Steven Hansen to review chart and enter orders. 0000: Assessment unchanged. Temp up to 103 orally. Blood cultures pending, patient already on Gentamicin and Levaquin. Medicated with Tylenol 650mg po for temp.   0100: Notified Dr. Amanda Silveira of D Dimer 1.23. Advised of CXR clear, RA sats %. Orders received for stat CTA of chest.  0330: Assessment unchanged. Patient denies pain, SOB, nausea. Temp down to 98.5. Lungs clear to auscultation. VSS on Rojas-Synephrine @ 20mcg/min.

## 2017-07-08 NOTE — PROGRESS NOTES
Report received from Vernell, results of labs noted. 0007  Assessment completed, denies pain in abdomen at present. Stated does have a headache, Tylenol given for temp and headache. Aunt at bedside. Up to toilet with help to void, dark blood noted but is on period. IV fluids infusing without difficulty. Denies hunger. 1000  Dr. Mika Bland rounded, to not monitor FSBS, and to wean Rojas for BP greater than 90. Dilaudid PRN dosage adjusted. 1110  BP WNL, will monitor and wean off if BP stays greater than SBP 95. Resting with eyes closed, Aunt remains at bedside. 1130  BP less than 90 systolic, Rojas increased back up to 20 mcs. 1200 C/o headache now,  Tylenol again given. Aunt remains at bedside. No other changes noted at present. 1325  States light and turning to the side increased her headache, but currently feels ok. Still waiting on lunch tray. 1630  Tylenol again given for headache by other nurse. Encouraged patient to try Dilaudid next time if unrelieved. Denies nausea. 1907  Report given to Vernell.

## 2017-07-09 LAB
ALBUMIN SERPL BCP-MCNC: 2.1 G/DL (ref 3.5–5)
ALBUMIN/GLOB SERPL: 0.6 {RATIO} (ref 1.1–2.2)
ALP SERPL-CCNC: 70 U/L (ref 45–117)
ALT SERPL-CCNC: 13 U/L (ref 12–78)
ANION GAP BLD CALC-SCNC: 6 MMOL/L (ref 5–15)
AST SERPL W P-5'-P-CCNC: 10 U/L (ref 15–37)
BASOPHILS # BLD AUTO: 0 K/UL (ref 0–0.1)
BASOPHILS # BLD: 0 % (ref 0–1)
BILIRUB SERPL-MCNC: 0.4 MG/DL (ref 0.2–1)
BUN SERPL-MCNC: 3 MG/DL (ref 6–20)
BUN/CREAT SERPL: 6 (ref 12–20)
CALCIUM SERPL-MCNC: 7.7 MG/DL (ref 8.5–10.1)
CHLORIDE SERPL-SCNC: 108 MMOL/L (ref 97–108)
CO2 SERPL-SCNC: 25 MMOL/L (ref 21–32)
CREAT SERPL-MCNC: 0.48 MG/DL (ref 0.55–1.02)
EOSINOPHIL # BLD: 0.2 K/UL (ref 0–0.4)
EOSINOPHIL NFR BLD: 2 % (ref 0–7)
ERYTHROCYTE [DISTWIDTH] IN BLOOD BY AUTOMATED COUNT: 12.8 % (ref 11.5–14.5)
GLOBULIN SER CALC-MCNC: 3.3 G/DL (ref 2–4)
GLUCOSE SERPL-MCNC: 92 MG/DL (ref 65–100)
HCT VFR BLD AUTO: 29.4 % (ref 35–47)
HGB BLD-MCNC: 10 G/DL (ref 11.5–16)
LYMPHOCYTES # BLD AUTO: 18 % (ref 12–49)
LYMPHOCYTES # BLD: 1.2 K/UL (ref 0.8–3.5)
MCH RBC QN AUTO: 31.1 PG (ref 26–34)
MCHC RBC AUTO-ENTMCNC: 34 G/DL (ref 30–36.5)
MCV RBC AUTO: 91.3 FL (ref 80–99)
MONOCYTES # BLD: 0.5 K/UL (ref 0–1)
MONOCYTES NFR BLD AUTO: 8 % (ref 5–13)
NEUTS SEG # BLD: 4.7 K/UL (ref 1.8–8)
NEUTS SEG NFR BLD AUTO: 72 % (ref 32–75)
PLATELET # BLD AUTO: 173 K/UL (ref 150–400)
POTASSIUM SERPL-SCNC: 3.6 MMOL/L (ref 3.5–5.1)
PROT SERPL-MCNC: 5.4 G/DL (ref 6.4–8.2)
RBC # BLD AUTO: 3.22 M/UL (ref 3.8–5.2)
SODIUM SERPL-SCNC: 139 MMOL/L (ref 136–145)
WBC # BLD AUTO: 6.6 K/UL (ref 3.6–11)

## 2017-07-09 PROCEDURE — 86757 RICKETTSIA ANTIBODY: CPT | Performed by: INTERNAL MEDICINE

## 2017-07-09 PROCEDURE — 80053 COMPREHEN METABOLIC PANEL: CPT | Performed by: INTERNAL MEDICINE

## 2017-07-09 PROCEDURE — 86666 EHRLICHIA ANTIBODY: CPT | Performed by: INTERNAL MEDICINE

## 2017-07-09 PROCEDURE — 85025 COMPLETE CBC W/AUTO DIFF WBC: CPT | Performed by: INTERNAL MEDICINE

## 2017-07-09 PROCEDURE — 36415 COLL VENOUS BLD VENIPUNCTURE: CPT | Performed by: INTERNAL MEDICINE

## 2017-07-09 PROCEDURE — 74011250636 HC RX REV CODE- 250/636: Performed by: INTERNAL MEDICINE

## 2017-07-09 PROCEDURE — 74011000258 HC RX REV CODE- 258: Performed by: INTERNAL MEDICINE

## 2017-07-09 PROCEDURE — 65610000006 HC RM INTENSIVE CARE

## 2017-07-09 PROCEDURE — 74011250636 HC RX REV CODE- 250/636: Performed by: FAMILY MEDICINE

## 2017-07-09 PROCEDURE — 74011000250 HC RX REV CODE- 250: Performed by: INTERNAL MEDICINE

## 2017-07-09 PROCEDURE — 74011250637 HC RX REV CODE- 250/637: Performed by: INTERNAL MEDICINE

## 2017-07-09 PROCEDURE — 74011250637 HC RX REV CODE- 250/637: Performed by: FAMILY MEDICINE

## 2017-07-09 RX ORDER — IBUPROFEN 400 MG/1
400 TABLET ORAL
Status: DISCONTINUED | OUTPATIENT
Start: 2017-07-09 | End: 2017-07-10 | Stop reason: HOSPADM

## 2017-07-09 RX ORDER — LEVOFLOXACIN 5 MG/ML
750 INJECTION, SOLUTION INTRAVENOUS EVERY 24 HOURS
Status: DISCONTINUED | OUTPATIENT
Start: 2017-07-09 | End: 2017-07-10 | Stop reason: HOSPADM

## 2017-07-09 RX ORDER — OXYCODONE HYDROCHLORIDE 5 MG/1
5 TABLET ORAL
Status: DISCONTINUED | OUTPATIENT
Start: 2017-07-09 | End: 2017-07-10 | Stop reason: HOSPADM

## 2017-07-09 RX ADMIN — IBUPROFEN 400 MG: 400 TABLET ORAL at 17:15

## 2017-07-09 RX ADMIN — ACETAMINOPHEN 650 MG: 325 TABLET, FILM COATED ORAL at 19:35

## 2017-07-09 RX ADMIN — Medication 10 ML: at 21:31

## 2017-07-09 RX ADMIN — IBUPROFEN 400 MG: 400 TABLET ORAL at 22:17

## 2017-07-09 RX ADMIN — MUPIROCIN: 20 OINTMENT TOPICAL at 17:22

## 2017-07-09 RX ADMIN — DOXYCYCLINE 100 MG: 100 INJECTION, POWDER, LYOPHILIZED, FOR SOLUTION INTRAVENOUS at 21:28

## 2017-07-09 RX ADMIN — Medication 10 ML: at 05:26

## 2017-07-09 RX ADMIN — OXYCODONE HYDROCHLORIDE 5 MG: 5 TABLET ORAL at 13:59

## 2017-07-09 RX ADMIN — HYDROMORPHONE HYDROCHLORIDE 0.2 MG: 1 INJECTION, SOLUTION INTRAMUSCULAR; INTRAVENOUS; SUBCUTANEOUS at 05:22

## 2017-07-09 RX ADMIN — Medication 10 ML: at 13:56

## 2017-07-09 RX ADMIN — IBUPROFEN 400 MG: 400 TABLET ORAL at 09:06

## 2017-07-09 RX ADMIN — DOXYCYCLINE 100 MG: 100 INJECTION, POWDER, LYOPHILIZED, FOR SOLUTION INTRAVENOUS at 10:16

## 2017-07-09 RX ADMIN — SODIUM CHLORIDE 100 ML/HR: 900 INJECTION, SOLUTION INTRAVENOUS at 07:38

## 2017-07-09 RX ADMIN — LEVOFLOXACIN 750 MG: 5 INJECTION, SOLUTION INTRAVENOUS at 14:00

## 2017-07-09 RX ADMIN — HYDROMORPHONE HYDROCHLORIDE 0.2 MG: 1 INJECTION, SOLUTION INTRAMUSCULAR; INTRAVENOUS; SUBCUTANEOUS at 00:25

## 2017-07-09 RX ADMIN — SODIUM CHLORIDE 100 ML/HR: 900 INJECTION, SOLUTION INTRAVENOUS at 17:12

## 2017-07-09 RX ADMIN — AZTREONAM 1 G: 1 INJECTION, POWDER, LYOPHILIZED, FOR SOLUTION INTRAMUSCULAR; INTRAVENOUS at 05:25

## 2017-07-09 RX ADMIN — MUPIROCIN: 20 OINTMENT TOPICAL at 10:22

## 2017-07-09 RX ADMIN — ACETAMINOPHEN 650 MG: 325 TABLET, FILM COATED ORAL at 07:58

## 2017-07-09 RX ADMIN — ACETAMINOPHEN 650 MG: 325 TABLET, FILM COATED ORAL at 03:49

## 2017-07-09 NOTE — PROGRESS NOTES
Hospitalist Progress Note    NAME: Tatiana Veras   :  1980   MRN:  [id number]     Admit date: 2017    Today's date: 17    PCP: Gary Cody M.D. Cell [phone]      Assessment / Plan:    Septic shock POA with fever 103, , hypotension now on pressors  ? Presumed left pyelonephritis POA  Headache POA  Remains in ICU on 10 mcg sierra  BC negative, urine culture with GNR > 100,000 at OSH  Left CVA tenderness seems improved  Positive HA  No rash, thrombocytopenia or abnormal LFTs, no clear tick exposures  Now just on levaquin, will change to aztreonam with her PCN allergy        Increased resistance to quinolones  No changes of pyelonephritis on CT scan  Staying on vacation in [address] neck, in woods, with the headache        Could this be tick-borne  Doxycycline and aztreonam for now till cultures back  Recurrent hypotension with IV dilaudid, will transition to PO pain    Hypokalemia and hypomagnesemia POA supplemented and improved. Will recheck    Non cardiogenic edema POA likely due to sepsis  Watch IVF  Treat underlying conditions  CTA negative for PE       Code status: Full  Prophylaxis: Lovenox  Recommended Disposition: Home w/Family     Subjective:     Chief Complaint / Reason for Physician Visit  \"Still have a headache\". Weaned off neosynephrine, then dropped BP after IV dilaudid, back on now  Currently on 10 mcg sierra  Still with some muscle aches  Moderate bilateral headache  Discussed with RN events overnight. Review of Systems:  Symptom Y/N Comments  Symptom Y/N Comments   Fever/Chills n   Chest Pain n    Poor Appetite    Edema     Cough n   Abdominal Pain n    Sputum    Joint Pain     SOB/KILLIAN n   Pruritis/Rash     Nausea/vomit n   Tolerating PT/OT     Diarrhea n   Tolerating Diet y    Constipation    Other       Could NOT obtain due to:      Objective:     VITALS:   Last 24hrs VS reviewed since prior progress note.  Most recent are:  Patient Vitals for the past 24 hrs:   Temp Pulse Resp BP SpO2   07/09/17 0630 - 64 22 92/55 -   07/09/17 0600 - 69 20 92/60 -   07/09/17 0530 - 67 23 97/64 -   07/09/17 0500 - 65 23 96/49 96 %   07/09/17 0430 - 63 26 93/52 97 %   07/09/17 0400 - 67 23 98/64 -   07/09/17 0334 98.1 °F (36.7 °C) 92 14 99/58 99 %   07/09/17 0300 - 61 24 (!) 82/49 97 %   07/09/17 0230 - 62 22 (!) 81/49 96 %   07/09/17 0200 - 72 21 (!) 78/53 97 %   07/09/17 0130 - 75 24 (!) 82/52 99 %   07/09/17 0100 - 73 25 (!) 82/45 97 %   07/09/17 0000 - 83 22 106/57 100 %   07/08/17 2330 98.6 °F (37 °C) 84 18 102/62 99 %   07/08/17 2300 - 82 26 91/42 -   07/08/17 2230 - 75 20 100/63 -   07/08/17 2130 - 78 29 115/70 -   07/08/17 2106 - 82 - 111/69 -   07/08/17 2030 - - - 112/68 -   07/08/17 2000 - 71 12 104/64 100 %   07/08/17 1946 98.5 °F (36.9 °C) 90 12 - 97 %   07/08/17 1900 - 74 20 106/60 99 %   07/08/17 1800 - 74 23 97/52 96 %   07/08/17 1655 98.8 °F (37.1 °C) 66 16 - 99 %   07/08/17 1630 - 68 (!) 5 106/69 96 %   07/08/17 1600 - 67 24 95/52 96 %   07/08/17 1530 - 66 24 96/55 98 %   07/08/17 1500 - 68 25 90/52 98 %   07/08/17 1400 - 70 18 (!) 119/13 100 %   07/08/17 1330 - 67 21 112/71 98 %   07/08/17 1300 - 69 18 97/56 99 %   07/08/17 1208 98.7 °F (37.1 °C) 69 23 - 100 %   07/08/17 1200 - 72 15 98/66 100 %   07/08/17 1134 - 74 21 (!) 87/60 99 %   07/08/17 1130 - 72 16 (!) 86/54 98 %   07/08/17 1100 - 70 20 98/54 99 %   07/08/17 1030 - 88 22 104/57 99 %   07/08/17 1015 98.8 °F (37.1 °C) - - - -   07/08/17 1000 - 76 17 105/64 100 %   07/08/17 0930 - 80 20 90/61 98 %   07/08/17 0900 - 81 29 103/59 99 %       Intake/Output Summary (Last 24 hours) at 07/09/17 0816  Last data filed at 07/09/17 0600   Gross per 24 hour   Intake          2855.46 ml   Output             3410 ml   Net          -554.54 ml        Wt Readings from Last 12 Encounters:   07/08/17 65.9 kg (145 lb 4.5 oz)   07/07/17 59.9 kg (132 lb)       PHYSICAL EXAM:  General: WD, WN.  Alert, cooperative, no acute distress    EENT:  PERRL. Anicteric sclerae. MMM  Neck:  No meningismus, no thyromegaly  Resp:  Decreased BS bilaterally, no wheezing. No accessory muscle use  CV:  Regular  rhythm,  No edema  GI:  Soft, Non distended, Non tender.  +Bowel sounds, no rebound    Mild left CVA tenderness  LN:  No cervical or inguinal KD  Neurologic:  Alert and oriented X 3, normal speech, non focal motor exam  Psych:   Good insight. Not anxious nor agitated  Skin:  No rashes or petechaie. No jaundice    Reviewed most current lab test results and cultures  YES  Reviewed most current radiology test results   YES  Review and summation of old records today    NO  Reviewed patient's current orders and MAR    YES  PMH/SH reviewed - no change compared to H&P  ________________________________________________________________________  Care Plan discussed with:    Comments   Patient x    Family      RN x    Care Manager     Consultant  X Dr Jacki Baker team rounds were held today with , nursing, pharmacist and clinical coordinator. Patient's plan of care was discussed; medications were reviewed and discharge planning was addressed. ________________________________________________________________________  Total NON critical care TIME:  25   Minutes    Total CRITICAL CARE TIME Spent:   Minutes non procedure based      Comments   >50% of visit spent in counseling and coordination of care     ________________________________________________________________________  Rei Smith MD     Procedures: see electronic medical records for all procedures/Xrays and details which were not copied into this note but were reviewed prior to creation of Plan. LABS:  I reviewed today's most current labs and imaging studies.   Pertinent labs include:  Recent Labs      07/09/17   0344  07/08/17   0328  07/07/17   0600   WBC  6.6  8.2  8.0   HGB  10.0*  9.6*  9.8*   HCT  29.4*  28.4* 30.0*   PLT  173  151  147*     Recent Labs      07/09/17   0344  07/08/17   0328  07/07/17   2222  07/07/17   0739   07/06/17 2015   NA  139  138  140   --    < >  137   K  3.6  3.7  4.0   --    < >  3.6   CL  108  109*  113*   --    < >  101   CO2  25  22  21   --    < >  27   GLU  92  93  104*   --    < >  101*   BUN  3*  3*  4*   --    < >  12   CREA  0.48*  0.43*  0.52*   --    < >  0.83   CA  7.7*  7.3*  7.1*   --    < >  8.4*   MG   --    --   2.5*  1.5*   --    --    ALB  2.1*  2.1*   --    --    --   3.4*   TBILI  0.4  0.4   --    --    --   0.9   SGOT  10*  9*   --    --    --   12*   ALT  13  13   --    --    --   14    < > = values in this interval not displayed.

## 2017-07-09 NOTE — PROGRESS NOTES
Hospitalist Progress Note    NAME: Kelsea Veras   :  1980   MRN:  [id number]     Admit date: 2017    Today's date: 17    PCP: Gary Mcintyre M.D. Cell [phone]      Assessment / Plan:    Septic shock POA with fever 103, , hypotension now on pressors  ? Presumed left pyelonephritis POA  Headache POA  Remains in ICU on 20 mcg levophed  BC negative, urine culture with GNR > 100,000 at OSH  CVA tenderness seems improved  Positive HA  No rash, thrombocytopenia or abnormal LFTs, no clear tick exposures  Received levaquin and a dose of gentamicin at midnight  Now just on levaquin, will change to aztreonam with her PCN allergy        Increased resistance to quinolones  No changes of pyelonephritis on CT scan  Staying on vacation in [address] neck, in woods, with the headache  Could this be Tick-borne  Add doxycycline, check serologies    Hypokalemia and hypomagnesemia POA supplemented and improved. Will recheck    Non cardiogenic edema POA likely due to sepsis  Watch IVF  Treat underlying conditions  CTA negative for PE       Code status: Full  Prophylaxis: Lovenox  Recommended Disposition: Home w/Family     Subjective:     Chief Complaint / Reason for Physician Visit  \"I have a headache\". Feels a bit better, remains on levophed gtt  Feels similar to prior episodes of  Pyelonephritis  Mod HA, aches in back and muscles are better    Discussed with RN events overnight. Review of Systems:  Symptom Y/N Comments  Symptom Y/N Comments   Fever/Chills y   Chest Pain n    Poor Appetite    Edema     Cough n   Abdominal Pain n    Sputum    Joint Pain     SOB/KILLIAN n   Pruritis/Rash     Nausea/vomit n   Tolerating PT/OT     Diarrhea n   Tolerating Diet y    Constipation    Other       Could NOT obtain due to:      Objective:     VITALS:   Last 24hrs VS reviewed since prior progress note.  Most recent are:  Patient Vitals for the past 24 hrs:   Temp Pulse Resp BP SpO2 07/08/17 1946 98.5 °F (36.9 °C) 90 12 - 97 %   07/08/17 1900 - 74 20 106/60 99 %   07/08/17 1800 - 74 23 97/52 96 %   07/08/17 1655 98.8 °F (37.1 °C) 66 16 - 99 %   07/08/17 1630 - 68 (!) 5 106/69 96 %   07/08/17 1600 - 67 24 95/52 96 %   07/08/17 1530 - 66 24 96/55 98 %   07/08/17 1500 - 68 25 90/52 98 %   07/08/17 1400 - 70 18 (!) 119/13 100 %   07/08/17 1330 - 67 21 112/71 98 %   07/08/17 1300 - 69 18 97/56 99 %   07/08/17 1208 98.7 °F (37.1 °C) 69 23 - 100 %   07/08/17 1200 - 72 15 98/66 100 %   07/08/17 1134 - 74 21 (!) 87/60 99 %   07/08/17 1130 - 72 16 (!) 86/54 98 %   07/08/17 1100 - 70 20 98/54 99 %   07/08/17 1030 - 88 22 104/57 99 %   07/08/17 1015 98.8 °F (37.1 °C) - - - -   07/08/17 1000 - 76 17 105/64 100 %   07/08/17 0930 - 80 20 90/61 98 %   07/08/17 0900 - 81 29 103/59 99 %   07/08/17 0815 (!) 101.3 °F (38.5 °C) 77 24 - 97 %   07/08/17 0800 - 77 17 100/56 98 %   07/08/17 0700 - 76 24 101/59 96 %   07/08/17 0630 - 79 24 103/57 98 %   07/08/17 0600 - 90 27 96/60 100 %   07/08/17 0530 - 70 23 100/59 98 %   07/08/17 0500 - 70 22 99/58 98 %   07/08/17 0430 - 71 20 94/52 97 %   07/08/17 0400 - 70 23 93/49 98 %   07/08/17 0330 98.5 °F (36.9 °C) 72 20 92/49 97 %   07/08/17 0300 - 76 21 (!) 89/46 97 %   07/08/17 0230 - 78 22 (!) 89/47 97 %   07/08/17 0200 - 86 23 90/50 98 %   07/08/17 0146 - 86 - (!) 88/46 -   07/08/17 0100 - (!) 102 (!) 31 96/47 97 %   07/08/17 0030 - (!) 102 (!) 32 95/47 98 %   07/08/17 0000 (!) 103 °F (39.4 °C) 99 (!) 31 109/66 100 %   07/07/17 2300 - (!) 117 21 94/71 -   07/07/17 2207 - 96 23 (!) 87/67 92 %   07/07/17 2100 - 84 19 (!) 82/43 100 %   07/07/17 2044 99 °F (37.2 °C) 93 13 (!) 89/49 100 %       Intake/Output Summary (Last 24 hours) at 07/08/17 2021  Last data filed at 07/08/17 1946   Gross per 24 hour   Intake          3103.51 ml   Output             2760 ml   Net           343.51 ml        Wt Readings from Last 12 Encounters:   07/07/17 64 kg (141 lb 1.5 oz)   07/07/17 59.9 kg (132 lb)       PHYSICAL EXAM:  General: WD, WN. Alert, cooperative, no acute distress    EENT:  PERRL. Anicteric sclerae. MMM  Neck:  No meningismus, no thyromegaly  Resp:  Crackles at bases bilaterally, no wheezing. No accessory muscle use  CV:  Regular  rhythm,  No edema  GI:  Soft, Non distended, Non tender.  +Bowel sounds, no rebound    No CVA tenderness  LN:  No cervical or inguinal KD  Neurologic:  Alert and oriented X 3, normal speech, non focal motor exam  Psych:   Good insight. Not anxious nor agitated  Skin:  No rashes or petechaie. No jaundice    Reviewed most current lab test results and cultures  YES  Reviewed most current radiology test results   YES  Review and summation of old records today    NO  Reviewed patient's current orders and MAR    YES  PMH/SH reviewed - no change compared to H&P  ________________________________________________________________________  Care Plan discussed with:    Comments   Patient x    Family      RN x    Care Manager     Consultant                        Multidiciplinary team rounds were held today with , nursing, pharmacist and clinical coordinator. Patient's plan of care was discussed; medications were reviewed and discharge planning was addressed. ________________________________________________________________________  Total NON critical care TIME:  35   Minutes    Total CRITICAL CARE TIME Spent:   Minutes non procedure based      Comments   >50% of visit spent in counseling and coordination of care     ________________________________________________________________________  John Arreola MD     Procedures: see electronic medical records for all procedures/Xrays and details which were not copied into this note but were reviewed prior to creation of Plan. LABS:  I reviewed today's most current labs and imaging studies.   Pertinent labs include:  Recent Labs      07/08/17   0328  07/07/17   0600  07/06/17 2015   WBC  8.2  8.0  9.8 HGB  9.6*  9.8*  12.2   HCT  28.4*  30.0*  35.8   PLT  151  147*  166     Recent Labs      07/08/17   0328  07/07/17   2222  07/07/17   0739  07/07/17   0600   07/06/17 2015   NA  138  140   --   139   < >  137   K  3.7  4.0   --   3.1*   < >  3.6   CL  109*  113*   --   104   < >  101   CO2  22  21   --   22   < >  27   GLU  93  104*   --   148*   < >  101*   BUN  3*  4*   --   10   < >  12   CREA  0.43*  0.52*   --   0.81   < >  0.83   CA  7.3*  7.1*   --   7.0*   < >  8.4*   MG   --   2.5*  1.5*   --    --    --    ALB  2.1*   --    --    --    --   3.4*   TBILI  0.4   --    --    --    --   0.9   SGOT  9*   --    --    --    --   12*   ALT  13   --    --    --    --   14    < > = values in this interval not displayed.

## 2017-07-09 NOTE — PROGRESS NOTES
[facility] Consult Service Progress NOTE  Pulmonary, Critical Care, and Sleep Medicine    Name: Smith Boyer MRN: [id number]   : 1980 Hospital: [facility]   Date: 2017  Admission Date: 2017     Chart and notes reviewed. Data reviewed. Pt seen on rounds earlier today. I have evaluated and examined the patient. Pt is acutely ill. Reviewed the evaluation and will assist in implementing the plan as outlined by Dr. Eli Griggs and team    Hospital Day: 3    Patient Active Problem List   Diagnosis Code    Pyelonephritis, acute N10    Sepsis with hypotension ([address] A41.9    Hypokalemia E87.6    Hypomagnesemia E83.42    Sepsis ([address] A41.9       I was asked by Keyla Copeland MD to see Smith Boyer in consultation for a chief complaint of sepsis with shock. Briefly, pt is a 38 yo F w/o significant PMHx admitted to [facility] with sepsis and hypotension UTI/acute pyelonephritis. Non-contrast CT abd/pelvis and retroperitoneal US were WNL w/o evidence of stones, perinephric abscess, hydronephrosis or hydroureter. Pt received multiple IVF boluses in ED with initial improvement in BP and eventually admitted to the floor and continued on maintenance IVF (NS at 200 mL/hr) and empiric IV Abx coverage with Levaquin. She spiked temp of 102 around 1500 and gentamicin added to her regimen. Pt continued to c/o left flank pain that is not relieved by IV Toradol but BP dropped when started on morphine and later Dilaudid. She received total of 2 L NS boluses with BP improving to 100/56. BP, however, dropped again to 83/52 and at this point family requested transfer to a higher level of care/ICU setting for possibility of pt's needing pressors support. Pt accepted by Dr. Eli Griggs, hospitalist on call at [facility]. Pt is in CCU on IV sierra. She received over 6 liters of IV fluids since admission. Is on her menstrual period. Flank, abdominal pain a little better.  He last UTI was six months ago. She does not always take abx and will self treat with cranberry juice and lots of wather. Long history of chronic UTIs.  Weaned off neosynephrine, then dropped BP after IV dilaudid, back on now 10 mcg sierra  Still with some muscle aches. No rash. D/w Dr. Tracie Duenas. GNR in urine culture. Final pending. Moderate bilateral headache behind her eyes- never before when she had UTI      IMPRESSION:   1. Sepsis with shock, most likely from urinary source but tick borne illness possible  2. Probable pyelonephritis with negative non contrast CT at [facility]  3. H/o recurrent UTI  4. Anemia from heavy periods? And now dilutional  5. Severe hypoalbuminemia  6. Pt is critically ill and at high risk of end organ dysfunction and failure  7. Critical care time with pt exclusive of procedures   30      minutes      RECOMMENDATIONS/PLAN:   1. Wean IV sierra  2. abx per   3. Await cultures  4. Once off pressors, may move to floor  5. If HA persists or pt declines again, would get LP  6. CCU monitoring  7. DVT, SUP prophylaxis   8. Will be available to assist in medical management while in the CCU pending disposition     ICU disposition :Unchanged.   Risk of deterioration: medium   [x] High complexity decision making was performed   [x] See my orders for details  Tubes:       Code: Full Code          Vital Signs: Intake/Output: Intake/Output:   Visit Vitals    /63    Pulse 73    Temp 98.6 °F (37 °C)    Resp 26    Ht 5' 4\" (1.626 m)    Wt 65.9 kg (145 lb 4.5 oz)    LMP 2017    SpO2 100%    BMI 24.94 kg/m2         O2 Device: Room air     Wt Readings from Last 4 Encounters:   17 65.9 kg (145 lb 4.5 oz)   17 59.9 kg (132 lb)       Temp (24hrs), Av.6 °F (37 °C), Min:98.1 °F (36.7 °C), Max:98.8 °F (37.1 °C)     Intake/Output Summary (Last 24 hours) at 17 1057  Last data filed at 17 1012   Gross per 24 hour   Intake          3195.46 ml   Output             3275 ml   Net -79.54 ml     Last shift:      07/09 0701 - 07/09 1900  In: 555 [P.O.:240; I.V.:315]  Out: 600 [Urine:600]  Last 3 shifts: 07/07 1901 - 07/09 0700  In: 4577.6 [P.O.:440; I.V.:4137.6]  Out: 4610 [Urine:4610]     Telemetry:    normal sinus rhythm    Physical Exam:    General: WDWN WF in no respiratory distress and acyanotic, alert and oriented times 3   HEENT: NCAT; no thrush   Neck: No abnormally enlarged lymph nodes. Supple   Lungs: normal air entry   Heart: Regular rate and rhythm   Abdomen: soft, non-tender, without masses or organomegaly   :    Extremity: negative, clubbing;    Neuro: alert   Skin: Warm; no rash   Pulses: Bilateral, Radial, 1+ Normal upper and lower extremity pulses   Capillary refill: normal warm;    DATA:    Interval lab and diagnostic data was reviewed. Interval radiology images were independently viewed and available reports were reviewed. All lab results for the last 24 hours reviewed.       MAR reviewed and pertinent medications noted or modified as needed    MEDS:   Current Facility-Administered Medications   Medication    oxyCODONE IR (ROXICODONE) tablet 5 mg    ibuprofen (MOTRIN) tablet 400 mg    PHENYLephrine (NEOSYNEPHRINE) 30,000 mcg in 0.9% sodium chloride 250 mL infusion    doxycycline (VIBRAMYCIN) 100 mg in 0.9% sodium chloride (MBP/ADV) 100 mL    aztreonam (AZACTAM) 1 g in 0.9% sodium chloride (MBP/ADV) 100 mL    sodium chloride (NS) flush 5-10 mL    0.9% sodium chloride infusion    acetaminophen (TYLENOL) tablet 650 mg    sodium chloride (NS) flush 5-10 mL    ondansetron (ZOFRAN) injection 4 mg    prochlorperazine (COMPAZINE) with saline injection 2.5 mg    mupirocin (BACTROBAN) 2 % ointment        Labs:    Recent Labs      07/09/17   0344  07/08/17   0328  07/07/17   0600   WBC  6.6  8.2  8.0   HGB  10.0*  9.6*  9.8*   PLT  173  151  147*     Recent Labs      07/09/17   0344  07/08/17   0328  07/07/17   2222  07/07/17   0739  07/07/17   0600   07/06/17 2015   NA  139  138  140   --   139   < >  137   K  3.6  3.7  4.0   --   3.1*   < >  3.6   CL  108  109*  113*   --   104   < >  101   CO2  25  22  21   --   22   < >  27   GLU  92  93  104*   --   148*   < >  101*   BUN  3*  3*  4*   --   10   < >  12   CREA  0.48*  0.43*  0.52*   --   0.81   < >  0.83   CA  7.7*  7.3*  7.1*   --   7.0*   < >  8.4*   MG   --    --   2.5*  1.5*   --    --    --    LAC   --   0.5  2.0   --   0.8   < >  0.5   ALB  2.1*  2.1*   --    --    --    --   3.4*   SGOT  10*  9*   --    --    --    --   12*   ALT  13  13   --    --    --    --   14   LPSE   --    --    --    --    --    --   68*    < > = values in this interval not displayed. No results for input(s): PH, PCO2, PO2, HCO3, FIO2 in the last 72 hours.   Recent Labs      07/08/17   0328  07/07/17   2222   TROIQ  <0.04  <0.04     No results found for: BNPP, BNP   Lab Results   Component Value Date/Time    Culture result: NO GROWTH 2 DAYS 07/07/2017 01:45 PM    Culture result: NO GROWTH 2 DAYS 07/07/2017 01:45 PM    Culture result: NO GROWTH 2 DAYS 07/07/2017 04:15 AM    Culture result: NO GROWTH 1 DAY 07/07/2017 04:15 AM     No results found for: VANCT, CPK  Lab Results   Component Value Date/Time    Color DARK YELLOW 07/07/2017 04:15 AM    Appearance HAZY 07/07/2017 04:15 AM    Specific gravity 1.020 07/07/2017 04:15 AM    pH (UA) 5.5 07/07/2017 04:15 AM    Protein 30 07/07/2017 04:15 AM    Glucose 100 07/07/2017 04:15 AM    Ketone 15 07/07/2017 04:15 AM    Blood MODERATE 07/07/2017 04:15 AM    Urobilinogen >8.0 07/07/2017 04:15 AM    Nitrites NEGATIVE  07/07/2017 04:15 AM    Leukocyte Esterase TRACE 07/07/2017 04:15 AM    WBC 20-50 07/07/2017 04:15 AM    RBC 0-5 07/07/2017 04:15 AM    Bacteria 1+ 07/07/2017 04:15 AM         Imaging:  []                           I have personally reviewed the patients radiographs and reports:        Results from Hospital Encounter encounter on 07/07/17   XR CHEST PORT   Narrative PORTABLE CHEST RADIOGRAPH/S: 7/7/2017 10:23 PM    INDICATION: Cough. COMPARISON: 7/6/2017. TECHNIQUE: Portable frontal semiupright radiograph/s of the chest.    FINDINGS:   The lungs are clear. The central airways are patent. No pneumothorax or pleural  effusion. Impression IMPRESSION:   Clear lungs. Results from [place] encounter on 07/07/17   CTA CHEST W OR W WO CONT   Narrative CT ANGIOGRAPHY CHEST. 7/8/2017 1:32 AM     INDICATION: Abnormal d-dimer, tachypnea. COMPARISON: CT abdomen pelvis 7/6/2017. Same day chest radiograph. TECHNIQUE: CT angiography of the chest was performed after the administration of  100 cc IV contrast (Isovue 370). Coronal and sagittal, and coronal MIP  reconstructions were performed. CT dose reduction was achieved through use of a  standardized protocol tailored for this examination and automatic exposure  control for dose modulation. FINDINGS:  Interlobular septal thickening and bronchial wall thickening are consistent with  interstitial edema. There are trace bilateral pleural effusions. The heart is at  the upper limits of normal in size globally, and the left ventricle is enlarged. There is a trace pericardial effusion. There is no pulmonary embolism. The  central airways are patent. No pneumothorax. No thoracic lymphadenopathy. The  visualized upper abdomen is normal.         Impression IMPRESSION: Pulmonary edema and trace pleural and pericardial effusions. Left  ventriculomegaly.       Keegan Cowan MD

## 2017-07-09 NOTE — ROUTINE PROCESS
Bedside and Verbal shift change report received from RAHEL Bishop(offgoing nurse). Report included the following information SBAR, Kardex, ED Summary, Procedure Summary, Intake/Output, MAR, Accordion, Recent Results, Med Rec Status and Cardiac Rhythm NSR. She is resting with her eyes closed, no facial grimaces or respiratory distress. The NeoSynephrine is infusing at 5 mcq/min  0758:Noted crying, she stated, \"I miss my children\". Bhavna Poster requested \"Tylenol\" for c/o \"Slight headache\"  0815:Resting with her eyes closed, and without facial grimaces  0905: Motrin added for muscle aching, otherwise she is resting at long intervals with her eyes closed. 1045:OOB to the [facility], her gait is slow and steady, no chest pain or SOB. She denies a headache, and now she is accepting her breakfast without c/o nausea or regurgitation. 1200:Her mother called to receive updates on her condition. Lungs are clear bilat. 1400:She ate 25% of her lunch. Medicated for a headache.  1500:Resting without facial grimaces. Her respirations are calm and quiet. 1715:OOB to the [facility], her gait remains steady and straight. She requested Motrin for neck and head discomfort. 1815:Her parents are at the bedside. No further c/o discomforts. 1930:Bedside and Verbal shift change report given to RAHEL Orellana (oncoming nurse) by myself (offgoing nurse). Report included the following information SBAR, Kardex, ED Summary, Procedure Summary, Intake/Output, MAR, Accordion, Recent Results, Med Rec Status and Cardiac Rhythm NSR.

## 2017-07-10 VITALS
TEMPERATURE: 98.4 F | RESPIRATION RATE: 20 BRPM | SYSTOLIC BLOOD PRESSURE: 105 MMHG | WEIGHT: 145.28 LBS | HEIGHT: 64 IN | HEART RATE: 60 BPM | DIASTOLIC BLOOD PRESSURE: 65 MMHG | OXYGEN SATURATION: 100 % | BODY MASS INDEX: 24.8 KG/M2

## 2017-07-10 LAB — R RICKETTSI IGG SER QL IA: NEGATIVE

## 2017-07-10 PROCEDURE — 74011250637 HC RX REV CODE- 250/637: Performed by: INTERNAL MEDICINE

## 2017-07-10 PROCEDURE — 74011250636 HC RX REV CODE- 250/636: Performed by: FAMILY MEDICINE

## 2017-07-10 PROCEDURE — 74011250637 HC RX REV CODE- 250/637: Performed by: FAMILY MEDICINE

## 2017-07-10 PROCEDURE — 93306 TTE W/DOPPLER COMPLETE: CPT

## 2017-07-10 RX ORDER — ONDANSETRON 4 MG/1
4 TABLET, ORALLY DISINTEGRATING ORAL
Qty: 10 TAB | Refills: 0 | Status: SHIPPED | OUTPATIENT
Start: 2017-07-10

## 2017-07-10 RX ORDER — DOXYCYCLINE 100 MG/1
100 TABLET ORAL 2 TIMES DAILY
Qty: 10 TAB | Refills: 0 | Status: SHIPPED | OUTPATIENT
Start: 2017-07-10 | End: 2017-07-15

## 2017-07-10 RX ORDER — IBUPROFEN 400 MG/1
400 TABLET ORAL
Qty: 25 TAB | Refills: 0 | Status: SHIPPED
Start: 2017-07-10

## 2017-07-10 RX ORDER — CIPROFLOXACIN 500 MG/1
500 TABLET ORAL 2 TIMES DAILY
Qty: 20 TAB | Refills: 0 | Status: SHIPPED | OUTPATIENT
Start: 2017-07-10 | End: 2017-07-20

## 2017-07-10 RX ADMIN — MUPIROCIN: 20 OINTMENT TOPICAL at 09:05

## 2017-07-10 RX ADMIN — ONDANSETRON 4 MG: 2 INJECTION INTRAMUSCULAR; INTRAVENOUS at 14:38

## 2017-07-10 RX ADMIN — IBUPROFEN 400 MG: 400 TABLET ORAL at 14:44

## 2017-07-10 RX ADMIN — Medication 10 ML: at 05:01

## 2017-07-10 RX ADMIN — SODIUM CHLORIDE 100 ML/HR: 900 INJECTION, SOLUTION INTRAVENOUS at 05:00

## 2017-07-10 NOTE — PROGRESS NOTES
Hospitalist Discharge Note    NAME: Reanna Veras   :  1980   MRN:  [id number]     Admit date: 2017    Discharge date: 07/10/17    PCP: None    Discharge Diagnoses:    Septic shock POA with fever 103, , hypotension now on pressors    ? Presumed left pyelonephritis POA    Headache POA likely reactive    Hypokalemia and hypomagnesemia POA supplemented and improved. Will recheck    Non cardiogenic edema POA likely due to sepsis    Code status: Full        Discharge Medications:  Current Discharge Medication List      START taking these medications    Details   ibuprofen (MOTRIN) 400 mg tablet Take 1 Tab by mouth every six (6) hours as needed. Qty: 25 Tab, Refills: 0      ciprofloxacin HCl (CIPRO) 500 mg tablet Take 1 Tab by mouth two (2) times a day for 10 days. Qty: 20 Tab, Refills: 0      doxycycline (ADOXA) 100 mg tablet Take 1 Tab by mouth two (2) times a day for 5 days. Qty: 10 Tab, Refills: 0         CONTINUE these medications which have CHANGED    Details   ondansetron (ZOFRAN ODT) 4 mg disintegrating tablet Take 1 Tab by mouth every eight (8) hours as needed for Nausea. Qty: 10 Tab, Refills: 0         STOP taking these medications       cefUROXime (CEFTIN) 500 mg tablet Comments:   Reason for Stopping:         HYDROcodone-acetaminophen (NORCO) 5-325 mg per tablet Comments:   Reason for Stopping:                Follow-up Information     Follow up With Details Comments Contact Info    None   None ([phone]) Patient stated that they have no PCP            Time spent on discharge:   I spent greater than 30 minutes on discharge, seeing and examining the patient, reconciling home meds and new meds, coordinating care with case management, doing the discharge papers and the D/C summary    Discharge disposition: home    Discharge Condition: Stable    Summary of admission H+P(copied from Dr Radha Nielesn note):     CHIEF COMPLAINT: Flank pain and fever.     HISTORY OF PRESENT ILLNESS: The patient is a 70-year-old female   with history of chronic UTIs, who presents to the hospital complaining   of the above mentioned symptoms. The patient reports that she   presented to [facility], presented with low   blood pressure and fevers. The patient reports that the symptoms   started about 2-3 days back. She felt that she had a urinary tract   infection. The patient reports that she has had multiple urinary tract   infections, usually that resolve on outpatient treatment, especially with   hydration. This time, she followed the same regimen, started drinking   increased amounts of fluid, but continued to have fever and chills. She   took some ibuprofen for the same, and started having some back pain   and bilateral flank pain. The patient also had one bout of vomiting, but   no melena, hemoptysis or hematemesis. She presents to the ER with a   temperature of 102.9, as well as was found to be hypotensive with   pallor and diaphoresis. The patient was admitted to the hospital and   was started on IV fluids, and levofloxacin. The patient continued to be   mildly hypotensive, was given multiple boluses of IV fluids and also   was started on gentamicin this morning. The patient continued to be   somewhat hypotensive, continued to have pain. Dose of Dilaudid was   tried, but the patient became hypotensive with the same, and thus, the   patient was requested to be transferred to [facility]. On arrival to the ICU, the patient was mildly hypotensive, and   complains of some pain in her bilateral flanks. The patient reports that   she did have a headache, but denies any neck pain associated with   that. Denies any blurred vision, any neck stiffness, any runny nose,   sore throat, trouble swallowing, or trouble with speech. The patient   does report that she had a brief episode of some chest \"tightness\" and   some shortness of breath this morning but that resolved. The patient   currently is maintaining 100% O2 saturation on the monitor. The patient   also denies any lightheadedness, dizziness, any constipation, diarrhea,   focal or generalized neurologic weakness, any rash. Denies recent sick   contracts, consumption of exotic or unconventional food, or any other   symptoms or problems. The patient's mother is at bedside. CTA chest FINDINGS:  Interlobular septal thickening and bronchial wall thickening are consistent with  interstitial edema. There are trace bilateral pleural effusions. The heart is at  the upper limits of normal in size globally, and the left ventricle is enlarged. There is a trace pericardial effusion. There is no pulmonary embolism. The  central airways are patent. No pneumothorax. No thoracic lymphadenopathy. The  visualized upper abdomen is normal.  IMPRESSION: Pulmonary edema and trace pleural and pericardial effusions. Left  Ventriculomegaly. CXR FINDINGS:   The lungs are clear. The central airways are patent. No pneumothorax or pleural  effusion. IMPRESSION:   Clear lungs. Renal US FINDINGS:  The right kidney measures 10.5 cm in length. The left kidney measures 11.7 cm in  length. No renal mass, stone or collecting system dilatation. Renal parenchymal  echotexture and cortical thickness is normal bilaterally. The abdominal aorta is unremarkable. IMPRESSION:  Unremarkable renal ultrasound. CT scan abdomen/pelvis FINDINGS:  LOWER CHEST: The visualized portions of the lung bases are clear. The absence of intravenous contrast material reduces the sensitivity for  evaluation of the solid parenchymal organs of the abdomen. ABDOMEN:  Liver: The liver is normal in size and contour with no focal abnormality. Gallbladder and bile ducts: There are no calcified stones and there is no  biliary duct dilatation. Spleen: No abnormality. Pancreas: No abnormality. Adrenal glands: No abnormality. Kidneys: No focal parenchymal abnormality. There is no renal or ureteral  calculus or obstruction. PELVIS:  Reproductive organs: The uterus is normal.    Bladder: No abnormality. RETROPERITONEUM: The aorta tapers without aneurysm. There is no retroperitoneal  adenopathy or mass. There is no pelvic mass or adenopathy. BOWEL AND MESENTERY: The small bowel is normal. The appendix is normal.  PERITONEUM: There is trace free pelvic fluid which is likely physiologic. There  is no intraperitoneal air. BONES AND SOFT TISSUES: The bones and soft tissues of the abdominal wall are  within normal limits. IMPRESSION: Normal unenhanced CT examination of the abdomen and pelvis. Hospital course:       Septic shock POA with fever 103, , hypotension now on pressors  ? Presumed left pyelonephritis POA  Headache POA likely reactive  Remains in ICU on 10 mcg sierra  BC negative, urine culture with GNR E coli, pan sensitive  Left CVA tenderness and headache improved  No meningismus or MS changes  No rash, thrombocytopenia or abnormal LFTs, no clear tick exposures  No changes of pyelonephritis on CT scan  Off pressors since yesterday and BP stable  No fevers or leukocytosis  Presumed acute left pyelonephritis, cannot entirely rule out tick borne illness  Po cipro and doxycycline at home  She will call me if anything changes at home in next few days or go to the ED       We discussed to expect continued improvement  Care and discharge plans D/W Dr Valentino Bledsoe    Hypokalemia and hypomagnesemia POA supplemented and improved. Will recheck    Non cardiogenic edema POA likely due to sepsis  Resolved, sats stable on room air, no SOB or CP  Treat underlying conditions  CTA negative for PE       Code status: Full  Prophylaxis: Lovenox  Recommended Disposition: Home w/Family     Subjective:     Chief Complaint / Reason for Physician Visit  \"I am tired\".   Off neosynephrine, BP borderline low but she is asymptomatic     BP normally runs in low 100s  HA definately improved, now 3/10, No muscles or chest pain  Poor appetite  Discussed with RN events overnight. Review of Systems:  Symptom Y/N Comments  Symptom Y/N Comments   Fever/Chills n   Chest Pain n    Poor Appetite    Edema     Cough n   Abdominal Pain n    Sputum    Joint Pain     SOB/KILLIAN n   Pruritis/Rash     Nausea/vomit n   Tolerating PT/OT     Diarrhea n   Tolerating Diet y    Constipation    Other       Could NOT obtain due to:      Objective:     VITALS:   Last 24hrs VS reviewed since prior progress note.  Most recent are:  Patient Vitals for the past 24 hrs:   Temp Pulse Resp BP SpO2   07/10/17 0900 - (!) 58 23 105/76 -   07/10/17 0800 97.9 °F (36.6 °C) 60 24 104/61 -   07/10/17 0700 - (!) 58 21 98/67 95 %   07/10/17 0600 - 61 22 97/62 99 %   07/10/17 0500 - (!) 58 20 (!) 87/50 -   07/10/17 0400 97.9 °F (36.6 °C) 65 19 101/74 -   07/10/17 0300 - (!) 56 20 100/65 -   07/10/17 0200 - 61 20 (!) 87/47 -   07/10/17 0100 - 63 21 102/70 -   07/09/17 2300 98.4 °F (36.9 °C) 67 22 98/63 -   07/09/17 2214 - 73 18 99/57 100 %   07/09/17 2100 - 76 25 113/62 -   07/09/17 2000 - 77 18 107/64 -   07/09/17 1900 98.6 °F (37 °C) 79 16 118/70 -   07/09/17 1800 - 67 15 108/68 -   07/09/17 1700 98.4 °F (36.9 °C) 66 22 100/71 98 %   07/09/17 1600 - 71 18 (!) 86/60 97 %   07/09/17 1500 - 71 22 91/55 98 %   07/09/17 1433 - 85 16 91/55 100 %   07/09/17 1400 - 78 17 90/54 100 %   07/09/17 1332 - 89 21 92/58 100 %   07/09/17 1330 - 87 23 (!) 88/52 100 %   07/09/17 1300 - 90 19 92/54 -   07/09/17 1230 - 81 (!) 33 91/59 (!) 85 %   07/09/17 1200 97.8 °F (36.6 °C) 73 13 93/56 100 %   07/09/17 1130 - 68 16 102/61 (!) 89 %   07/09/17 1128 - 71 15 107/68 100 %   07/09/17 1100 - 74 22 - -   07/09/17 1030 - 88 21 97/54 99 %   07/09/17 1000 - 73 26 103/63 100 %   07/09/17 0955 - 71 (!) 7 107/66 100 %       Intake/Output Summary (Last 24 hours) at 07/10/17 0934  Last data filed at 07/10/17 0700   Gross per 24 hour   Intake             3185 ml   Output 3325 ml   Net             -140 ml        Wt Readings from Last 12 Encounters:   07/08/17 65.9 kg (145 lb 4.5 oz)   07/07/17 59.9 kg (132 lb)       PHYSICAL EXAM:  General: WD, WN. Alert, cooperative, no acute distress, ambulatory in room    Neck:  No meningismus  Resp:  CTA bilaterally, no wheezing. No accessory muscle use  CV:  Regular  rhythm,  No edema  GI:  Soft, Non distended, Non tender.  +Bowel sounds, no rebound    No CVA tenderness  LN:  No cervical or inguinal KD  Neurologic:  Alert and oriented X 3, normal speech, non focal motor exam  Psych:   Good insight. Not anxious nor agitated  Skin:  No rashes or petechaie. No jaundice    Reviewed most current lab test results and cultures  YES  Reviewed most current radiology test results   YES  Review and summation of old records today    NO  Reviewed patient's current orders and MAR    YES  PMH/SH reviewed - no change compared to H&P  ________________________________________________________________________  Care Plan discussed with:    Comments   Patient x    Family      RN x    Care Manager     Consultant  X Dr Aretha Stoddard team rounds were held today with , nursing, pharmacist and clinical coordinator. Patient's plan of care was discussed; medications were reviewed and discharge planning was addressed. ________________________________________________________________________      Comments   >50% of visit spent in counseling and coordination of care     ________________________________________________________________________  Olam Cushing, MD     Procedures: see electronic medical records for all procedures/Xrays and details which were not copied into this note but were reviewed prior to creation of Plan. LABS:  I reviewed today's most current labs and imaging studies.   Pertinent labs include:  Recent Labs      07/09/17   0344  07/08/17   0328   WBC  6.6  8.2   HGB  10.0*  9.6*   HCT  29.4*  28.4* PLT  173  151     Recent Labs      07/09/17   0344  07/08/17   0328  07/07/17   2222   NA  139  138  140   K  3.6  3.7  4.0   CL  108  109*  113*   CO2  25  22  21   GLU  92  93  104*   BUN  3*  3*  4*   CREA  0.48*  0.43*  0.52*   CA  7.7*  7.3*  7.1*   MG   --    --   2.5*   ALB  2.1*  2.1*   --    TBILI  0.4  0.4   --    SGOT  10*  9*   --    ALT  13  13   --

## 2017-07-10 NOTE — PROGRESS NOTES
Bedside and Verbal shift change report received from TATYANA Seo (offgoing nurse). Report included the following information SBAR, Kardex, Procedure Summary, Intake/Output, MAR, Recent Results, Med Rec Status and Cardiac Rhythm NSR.

## 2017-07-10 NOTE — PROGRESS NOTES
6610  Report received from offgoing nurse. 0930  IV DOXYCYCLINE HELD PER HOSPITALIST, due to pending discharge post echo. 1006  Patient will be receiving ECHO prior to discharge. Receiving nurse will call back for report on patient when available. 1034  Report given to Bettie Yanez RN  on Delores Veras  being transferred to [address] [place] [address] for routine progression of care       Report consisted of patients Situation, Background, Assessment and   Recommendations(SBAR). Information from the following report(s) SBAR, Kardex, Intake/Output and Recent Results was reviewed with the receiving nurse. Lines:   Peripheral IV 07/07/17 Left Hand (Active)   Site Assessment Clean, dry, & intact 7/9/2017  9:00 PM   Phlebitis Assessment 0 7/9/2017  9:00 PM   Infiltration Assessment 0 7/9/2017  9:00 PM   Dressing Status Clean 7/9/2017  9:00 PM   Dressing Type Tape;Transparent 7/9/2017  9:00 PM   Hub Color/Line Status Pink; Infusing;Flushed 7/9/2017  9:00 PM   Action Taken Open ports on tubing capped 7/9/2017  9:00 PM   Alcohol Cap Used Yes 7/9/2017  9:00 PM       Peripheral IV 07/08/17 Right; Outer Antecubital (Active)   Site Assessment Clean, dry, & intact 7/9/2017  9:00 PM   Phlebitis Assessment 0 7/9/2017  9:00 PM   Infiltration Assessment 0 7/9/2017  9:00 PM   Dressing Status Clean, dry, & intact 7/9/2017  9:00 PM   Dressing Type Tape;Transparent 7/9/2017  9:00 PM   Hub Color/Line Status Purple 7/9/2017  9:00 PM   Action Taken Open ports on tubing capped 7/9/2017  9:00 PM        Opportunity for questions and clarification was provided.       Patient transported with:   [employer]

## 2017-07-10 NOTE — PROGRESS NOTES
Bedside and Verbal shift change report given to Ren Lundborg, RN (oncoming nurse) by Shawna Puri RN (offgoing nurse). Report included the following information SBAR, Kardex, Procedure Summary, Intake/Output, MAR, Recent Results, Med Rec Status and Cardiac Rhythm NSR.

## 2017-07-10 NOTE — PROGRESS NOTES
[facility] Consult Service Progress NOTE  Pulmonary, Critical Care, and Sleep Medicine    Name: Elyse Maxwell MRN: [id number]   : 1980 Hospital: [facility]   Date: 7/10/2017  Admission Date: 2017     Chart and notes reviewed. Data reviewed. Pt seen on rounds earlier today. I have evaluated and examined the patient. Pt is acutely ill. Reviewed the evaluation and will assist in implementing the plan as outlined by Dr. Alireza Zaragoza and team    Hospital Day: 4        IMPRESSION:   1. Sepsis with shock, most likely from urinary source but tick borne illness possible  2. Probable pyelonephritis with negative non contrast CT at [address]  3. H/o recurrent UTI  4. Anemia from heavy periods? And now dilutional  5. Severe hypoalbuminemia  6. Pt is critically ill and at high risk of end organ dysfunction and failure  7. Critical care time with pt exclusive of procedures   30      minutes      RECOMMENDATIONS/PLAN:   1. Weaned off IV sierra  2. abx per   3. Await cultures  4. May move to floor  5. Mobilize   6. DVT, SUP prophylaxis   7. Will be available to assist in medical management while in the CCU pending disposition     ICU disposition :Unchanged.   Risk of deterioration: medium   [x] High complexity decision making was performed   [x] See my orders for details  Tubes:       Code: Full Code          Vital Signs: Intake/Output: Intake/Output:   Visit Vitals    /61    Pulse 60    Temp 97.9 °F (36.6 °C)    Resp 24    Ht 5' 4\" (1.626 m)    Wt 65.9 kg (145 lb 4.5 oz)    [facility] 2017    SpO2 95%    BMI 24.94 kg/m2         O2 Device: Room air     Wt Readings from Last 4 Encounters:   17 65.9 kg (145 lb 4.5 oz)   17 59.9 kg (132 lb)       Temp (24hrs), Av.2 °F (36.8 °C), Min:97.8 °F (36.6 °C), Max:98.6 °F (37 °C)     Intake/Output Summary (Last 24 hours) at 07/10/17 1789  Last data filed at 07/10/17 0500   Gross per 24 hour   Intake 2925 ml   Output             2725 ml   Net              200 ml     Last shift:         Last 3 shifts: 07/08 1901 - 07/10 0700  In: 4583.5 [P.O.:460; I.V.:4123.5]  Out: 4875 [Urine:4875]     Telemetry:    normal sinus rhythm    Physical Exam:    General: WDWN WF in no respiratory distress and acyanotic, alert and oriented times 3   HEENT: NCAT; no thrush   Neck: No abnormally enlarged lymph nodes. Supple   Lungs: normal air entry   Heart: Regular rate and rhythm   Abdomen: soft, non-tender, without masses or organomegaly   :    Extremity: negative, clubbing;    Neuro: alert   Skin: Warm; no rash   Pulses: Bilateral, Radial, 1+ Normal upper and lower extremity pulses   Capillary refill: normal warm;    DATA:    Interval lab and diagnostic data was reviewed. Interval radiology images were independently viewed and available reports were reviewed. All lab results for the last 24 hours reviewed.       MAR reviewed and pertinent medications noted or modified as needed    MEDS:   Current Facility-Administered Medications   Medication    oxyCODONE IR (ROXICODONE) tablet 5 mg    ibuprofen (MOTRIN) tablet 400 mg    levoFLOXacin (LEVAQUIN) 750 mg in D5W IVPB    doxycycline (VIBRAMYCIN) 100 mg in 0.9% sodium chloride (MBP/ADV) 100 mL    sodium chloride (NS) flush 5-10 mL    0.9% sodium chloride infusion    acetaminophen (TYLENOL) tablet 650 mg    sodium chloride (NS) flush 5-10 mL    ondansetron (ZOFRAN) injection 4 mg    prochlorperazine (COMPAZINE) with saline injection 2.5 mg    mupirocin (BACTROBAN) 2 % ointment        Labs:    Recent Labs      07/09/17   0344  07/08/17   0328   WBC  6.6  8.2   HGB  10.0*  9.6*   PLT  173  151     Recent Labs      07/09/17   0344  07/08/17   0328  07/07/17   2222   NA  139  138  140   K  3.6  3.7  4.0   CL  108  109*  113*   CO2  25  22  21   GLU  92  93  104*   BUN  3*  3*  4*   CREA  0.48*  0.43*  0.52*   CA  7.7*  7.3*  7.1*   MG   --    --   2.5*   LAC   --   0.5 2. 0   ALB  2.1*  2.1*   --    SGOT  10*  9*   --    ALT  13  13   --      No results for input(s): PH, PCO2, PO2, HCO3, FIO2 in the last 72 hours.   Recent Labs      07/08/17   0328  07/07/17   2222   TROIQ  <0.04  <0.04     No results found for: BNPP, BNP   Lab Results   Component Value Date/Time    Culture result: NO GROWTH 3 DAYS 07/07/2017 01:45 PM    Culture result: NO GROWTH 3 DAYS 07/07/2017 01:45 PM    Culture result: NO GROWTH 3 DAYS 07/07/2017 04:15 AM    Culture result: NO GROWTH 1 DAY 07/07/2017 04:15 AM     No results found for: VANCT, CPK  Lab Results   Component Value Date/Time    Color DARK YELLOW 07/07/2017 04:15 AM    Appearance HAZY 07/07/2017 04:15 AM    Specific gravity 1.020 07/07/2017 04:15 AM    pH (UA) 5.5 07/07/2017 04:15 AM    Protein 30 07/07/2017 04:15 AM    Glucose 100 07/07/2017 04:15 AM    Ketone 15 07/07/2017 04:15 AM    Blood MODERATE 07/07/2017 04:15 AM    Urobilinogen >8.0 07/07/2017 04:15 AM    Nitrites NEGATIVE  07/07/2017 04:15 AM    Leukocyte Esterase TRACE 07/07/2017 04:15 AM    WBC 20-50 07/07/2017 04:15 AM    RBC 0-5 07/07/2017 04:15 AM    Bacteria 1+ 07/07/2017 04:15 AM         Imaging:  []                           I have personally reviewed the patients radiographs and reports:        Cande Crawford MD

## 2017-07-10 NOTE — DISCHARGE SUMMARY
Hospitalist Discharge Note    NAME: Shreyas Veras   :  1980   MRN:  [id number]     Admit date: 2017    Discharge date: 07/10/17    PCP: None    Discharge Diagnoses:    Septic shock POA with fever 103, , hypotension now on pressors    ? Presumed left pyelonephritis POA    Headache POA likely reactive    Hypokalemia and hypomagnesemia POA supplemented and improved. Will recheck    Non cardiogenic edema POA likely due to sepsis    Code status: Full        Discharge Medications:  Current Discharge Medication List      START taking these medications    Details   ibuprofen (MOTRIN) 400 mg tablet Take 1 Tab by mouth every six (6) hours as needed. Qty: 25 Tab, Refills: 0      ciprofloxacin HCl (CIPRO) 500 mg tablet Take 1 Tab by mouth two (2) times a day for 10 days. Qty: 20 Tab, Refills: 0      doxycycline (ADOXA) 100 mg tablet Take 1 Tab by mouth two (2) times a day for 5 days. Qty: 10 Tab, Refills: 0         CONTINUE these medications which have CHANGED    Details   ondansetron (ZOFRAN ODT) 4 mg disintegrating tablet Take 1 Tab by mouth every eight (8) hours as needed for Nausea. Qty: 10 Tab, Refills: 0         STOP taking these medications       cefUROXime (CEFTIN) 500 mg tablet Comments:   Reason for Stopping:         HYDROcodone-acetaminophen (NORCO) 5-325 mg per tablet Comments:   Reason for Stopping:                Follow-up Information     Follow up With Details Comments Contact Info    None   None (395) Patient stated that they have no PCP            Time spent on discharge:   I spent greater than 30 minutes on discharge, seeing and examining the patient, reconciling home meds and new meds, coordinating care with case management, doing the discharge papers and the D/C summary    Discharge disposition: home    Discharge Condition: Stable    Summary of admission H+P(copied from Dr Cedric Milton note):     CHIEF COMPLAINT: Flank pain and fever.     HISTORY OF PRESENT ILLNESS: The patient is a 77-year-old female   with history of chronic UTIs, who presents to the hospital complaining   of the above mentioned symptoms. The patient reports that she   presented to [facility], presented with low   blood pressure and fevers. The patient reports that the symptoms   started about 2-3 days back. She felt that she had a urinary tract   infection. The patient reports that she has had multiple urinary tract   infections, usually that resolve on outpatient treatment, especially with   hydration. This time, she followed the same regimen, started drinking   increased amounts of fluid, but continued to have fever and chills. She   took some ibuprofen for the same, and started having some back pain   and bilateral flank pain. The patient also had one bout of vomiting, but   no melena, hemoptysis or hematemesis. She presents to the ER with a   temperature of 102.9, as well as was found to be hypotensive with   pallor and diaphoresis. The patient was admitted to the hospital and   was started on IV fluids, and levofloxacin. The patient continued to be   mildly hypotensive, was given multiple boluses of IV fluids and also   was started on gentamicin this morning. The patient continued to be   somewhat hypotensive, continued to have pain. Dose of Dilaudid was   tried, but the patient became hypotensive with the same, and thus, the   patient was requested to be transferred to [facility]. On arrival to the ICU, the patient was mildly hypotensive, and   complains of some pain in her bilateral flanks. The patient reports that   she did have a headache, but denies any neck pain associated with   that. Denies any blurred vision, any neck stiffness, any runny nose,   sore throat, trouble swallowing, or trouble with speech. The patient   does report that she had a brief episode of some chest \"tightness\" and   some shortness of breath this morning but that resolved. The patient   currently is maintaining 100% O2 saturation on the monitor. The patient   also denies any lightheadedness, dizziness, any constipation, diarrhea,   focal or generalized neurologic weakness, any rash. Denies recent sick   contracts, consumption of exotic or unconventional food, or any other   symptoms or problems. The patient's mother is at bedside. CTA chest FINDINGS:  Interlobular septal thickening and bronchial wall thickening are consistent with  interstitial edema. There are trace bilateral pleural effusions. The heart is at  the upper limits of normal in size globally, and the left ventricle is enlarged. There is a trace pericardial effusion. There is no pulmonary embolism. The  central airways are patent. No pneumothorax. No thoracic lymphadenopathy. The  visualized upper abdomen is normal.  IMPRESSION: Pulmonary edema and trace pleural and pericardial effusions. Left  Ventriculomegaly. CXR FINDINGS:   The lungs are clear. The central airways are patent. No pneumothorax or pleural  effusion. IMPRESSION:   Clear lungs. Renal US FINDINGS:  The right kidney measures 10.5 cm in length. The left kidney measures 11.7 cm in  length. No renal mass, stone or collecting system dilatation. Renal parenchymal  echotexture and cortical thickness is normal bilaterally. The abdominal aorta is unremarkable. IMPRESSION:  Unremarkable renal ultrasound. CT scan abdomen/pelvis FINDINGS:  LOWER CHEST: The visualized portions of the lung bases are clear. The absence of intravenous contrast material reduces the sensitivity for  evaluation of the solid parenchymal organs of the abdomen. ABDOMEN:  Liver: The liver is normal in size and contour with no focal abnormality. Gallbladder and bile ducts: There are no calcified stones and there is no  biliary duct dilatation. Spleen: No abnormality. Pancreas: No abnormality. Adrenal glands: No abnormality. Kidneys: No focal parenchymal abnormality. There is no renal or ureteral  calculus or obstruction. PELVIS:  Reproductive organs: The uterus is normal.    Bladder: No abnormality. RETROPERITONEUM: The aorta tapers without aneurysm. There is no retroperitoneal  adenopathy or mass. There is no pelvic mass or adenopathy. BOWEL AND MESENTERY: The small bowel is normal. The appendix is normal.  PERITONEUM: There is trace free pelvic fluid which is likely physiologic. There  is no intraperitoneal air. BONES AND SOFT TISSUES: The bones and soft tissues of the abdominal wall are  within normal limits. IMPRESSION: Normal unenhanced CT examination of the abdomen and pelvis. Hospital course:       Septic shock POA with fever 103, , hypotension now on pressors  ? Presumed left pyelonephritis POA  Headache POA likely reactive  Remains in ICU on 10 mcg sierra  BC negative, urine culture with GNR E coli, pan sensitive  Left CVA tenderness and headache improved  No meningismus or MS changes  No rash, thrombocytopenia or abnormal LFTs, no clear tick exposures  No changes of pyelonephritis on CT scan  Off pressors since yesterday and BP stable  No fevers or leukocytosis  Presumed acute left pyelonephritis, cannot entirely rule out tick borne illness  Po cipro and doxycycline at home  She will call me if anything changes at home in next few days or go to the ED       We discussed to expect continued improvement  Care and discharge plans D/W Dr Margaret Gonzalez    Hypokalemia and hypomagnesemia POA supplemented and improved.  Will recheck    Non cardiogenic edema POA likely due to sepsis  Resolved, sats stable on room air, no SOB or CP  Treat underlying conditions  CTA negative for PE , did have left ventriculomegaly  Echo TTE LVEF 55-60%, no wall motion changes, normal RV size and function       Mild MR, no AS            Code status: Full  Prophylaxis: Lovenox  Recommended Disposition: Home w/Family     Subjective:     Chief Complaint / Reason for Physician Visit  \"I am tired\". Off neosynephrine, BP borderline low but she is asymptomatic     BP normally runs in low 100s  HA definately improved, now 3/10,   No muscles or chest pain  Poor appetite  Discussed with RN events overnight. Review of Systems:  Symptom Y/N Comments  Symptom Y/N Comments   Fever/Chills n   Chest Pain n    Poor Appetite    Edema     Cough n   Abdominal Pain n    Sputum    Joint Pain     SOB/KILLIAN n   Pruritis/Rash     Nausea/vomit n   Tolerating PT/OT     Diarrhea n   Tolerating Diet y    Constipation    Other       Could NOT obtain due to:      Objective:     VITALS:   Last 24hrs VS reviewed since prior progress note.  Most recent are:  Patient Vitals for the past 24 hrs:   Temp Pulse Resp BP SpO2   07/10/17 1133 98.4 °F (36.9 °C) 60 20 105/65 100 %   07/10/17 0900 - (!) 58 23 105/76 -   07/10/17 0800 97.9 °F (36.6 °C) 60 24 104/61 -   07/10/17 0700 - (!) 58 21 98/67 95 %   07/10/17 0600 - 61 22 97/62 99 %   07/10/17 0500 - (!) 58 20 (!) 87/50 -   07/10/17 0400 97.9 °F (36.6 °C) 65 19 101/74 -   07/10/17 0300 - (!) 56 20 100/65 -   07/10/17 0200 - 61 20 (!) 87/47 -   07/10/17 0100 - 63 21 102/70 -   07/09/17 2300 98.4 °F (36.9 °C) 67 22 98/63 -   07/09/17 2214 - 73 18 99/57 100 %   07/09/17 2100 - 76 25 113/62 -   07/09/17 2000 - 77 18 107/64 -   07/09/17 1900 98.6 °F (37 °C) 79 16 118/70 -   07/09/17 1800 - 67 15 108/68 -   07/09/17 1700 98.4 °F (36.9 °C) 66 22 100/71 98 %   07/09/17 1600 - 71 18 (!) 86/60 97 %   07/09/17 1500 - 71 22 91/55 98 %   07/09/17 1433 - 85 16 91/55 100 %   07/09/17 1400 - 78 17 90/54 100 %   07/09/17 1332 - 89 21 92/58 100 %   07/09/17 1330 - 87 23 (!) 88/52 100 %   07/09/17 1300 - 90 19 92/54 -   07/09/17 1230 - 81 (!) 33 91/59 (!) 85 %       Intake/Output Summary (Last 24 hours) at 07/10/17 1211  Last data filed at 07/10/17 0700   Gross per 24 hour   Intake             2530 ml   Output             2475 ml   Net               55 ml        Wt Readings from Last 12 Encounters: 07/08/17 65.9 kg (145 lb 4.5 oz)   07/07/17 59.9 kg (132 lb)       PHYSICAL EXAM:  General: WD, WN. Alert, cooperative, no acute distress, ambulatory in room    Neck:  No meningismus  Resp:  CTA bilaterally, no wheezing. No accessory muscle use  CV:  Regular  rhythm,  No edema  GI:  Soft, Non distended, Non tender.  +Bowel sounds, no rebound    No CVA tenderness  LN:  No cervical or inguinal KD  Neurologic:  Alert and oriented X 3, normal speech, non focal motor exam  Psych:   Good insight. Not anxious nor agitated  Skin:  No rashes or petechaie. No jaundice    Reviewed most current lab test results and cultures  YES  Reviewed most current radiology test results   YES  Review and summation of old records today    NO  Reviewed patient's current orders and MAR    YES  PMH/ reviewed - no change compared to H&P  ________________________________________________________________________  Care Plan discussed with:    Comments   Patient x    Family      RN x    Care Manager     Consultant  X Dr Giovanny Hendrickson team rounds were held today with , nursing, pharmacist and clinical coordinator. Patient's plan of care was discussed; medications were reviewed and discharge planning was addressed. ________________________________________________________________________      Comments   >50% of visit spent in counseling and coordination of care     ________________________________________________________________________  Dru Coburn MD     Procedures: see electronic medical records for all procedures/Xrays and details which were not copied into this note but were reviewed prior to creation of Plan. LABS:  I reviewed today's most current labs and imaging studies.   Pertinent labs include:  Recent Labs      07/09/17 0344 07/08/17 0328   WBC  6.6  8.2   HGB  10.0*  9.6*   HCT  29.4*  28.4*   PLT  173  151     Recent Labs      07/09/17 0344 07/08/17 0328 07/07/17   2222   NA  139  138  140   K  3.6  3.7  4.0   CL  108  109*  113*   CO2  25  22  21   GLU  92  93  104*   BUN  3*  3*  4*   CREA  0.48*  0.43*  0.52*   CA  7.7*  7.3*  7.1*   MG   --    --   2.5*   ALB  2.1*  2.1*   --    TBILI  0.4  0.4   --    SGOT  10*  9*   --    ALT  13  13   --

## 2017-07-10 NOTE — DISCHARGE INSTRUCTIONS
Patient Discharge Instructions    Rohith Luciano / [id number] : 1980    Admitted 2017 Discharged: 7/10/2017         DISCHARGE DIAGNOSIS:     Sepsis ([address] (2017)    E coli pyelonephritis    Headache ? Tickborne infection       What to do at Home    1. Recommended diet: Regular Diet    2. Recommended activity: Activity as tolerated    3. If you experience any of the following symptoms then please call your primary care physician or return to the emergency room if you cannot get hold of your doctor:   Fevers > 100.5, chills   Nausea or vomiting, persistent diarrhea > 24 hours   Blood in stool or black stools   Chest pain or SOB      Follow-up Information     Follow up With Details Comments Contact Info    None   None (395) Patient stated that they have no PCP          Should expect to feel better every day, decreasing symptoms and no fevers    Call Dr Adriane Marie or go to the ED if you develop new fevers or worsening headache       Cell phone [phone] 7 am to 9 pm daily      Information obtained by :  I understand that if any problems occur once I am at home I am to contact my physician. I understand and acknowledge receipt of the instructions indicated above.                                                                                                                                            Physician's or R.N.'s Signature                                                                  Date/Time                                                                                                                                              Patient or Representative Signature                                                          Date/Time

## 2017-07-10 NOTE — PROGRESS NOTES
Patient was discharged home with 3 prescriptions. IV was removed per hospital policy. Discharge instructions, medications, and side effects reviewed and understood.

## 2017-07-10 NOTE — PROGRESS NOTES
Attended Interdisciplinary rounds in Critical Care Unit, where patient care was discussed. Visit by: Noemy Gilman. Mynor Sharma.  Serjio Hodges MA, [address]

## 2017-07-11 LAB — R RICKETTSI IGM TITR SER: 0.37 INDEX (ref 0–0.89)

## 2017-07-13 LAB
A PHAGOCYTOPH IGG TITR SER IF: NEGATIVE {TITER}
A PHAGOCYTOPH IGM TITR SER IF: NEGATIVE {TITER}
E CHAFFEENSIS IGG TITR SER IF: NEGATIVE {TITER}
E CHAFFEENSIS IGM TITR SER IF: NEGATIVE {TITER}